# Patient Record
Sex: FEMALE | Race: WHITE | NOT HISPANIC OR LATINO | Employment: OTHER | URBAN - METROPOLITAN AREA
[De-identification: names, ages, dates, MRNs, and addresses within clinical notes are randomized per-mention and may not be internally consistent; named-entity substitution may affect disease eponyms.]

---

## 2017-03-13 RX ORDER — PIOGLITAZONEHYDROCHLORIDE 15 MG/1
TABLET ORAL
Qty: 90 TABLET | Refills: 2 | OUTPATIENT
Start: 2017-03-13

## 2018-06-25 ENCOUNTER — TELEPHONE (OUTPATIENT)
Dept: GASTROENTEROLOGY | Facility: CLINIC | Age: 63
End: 2018-06-25

## 2018-06-25 NOTE — TELEPHONE ENCOUNTER
UNABLE TO LOCATE IN CHART.  EXPLAINED ABOUT THE MOVE FROM [facility] TO [facility].  MAILED AUTHORIZATION TO OBTAIN HEALTH INFORMATIION.  VERIFY ADDRESS, WHICH IS CORRECT.  MAILED TODAY.

## 2019-03-13 ENCOUNTER — OFFICE VISIT (OUTPATIENT)
Dept: GASTROENTEROLOGY | Facility: CLINIC | Age: 64
End: 2019-03-13

## 2019-03-13 VITALS
HEIGHT: 59 IN | BODY MASS INDEX: 30.24 KG/M2 | WEIGHT: 150 LBS | DIASTOLIC BLOOD PRESSURE: 96 MMHG | HEART RATE: 91 BPM | SYSTOLIC BLOOD PRESSURE: 134 MMHG

## 2019-03-13 DIAGNOSIS — Z86.010 PERSONAL HISTORY OF COLONIC POLYPS: ICD-10-CM

## 2019-03-13 DIAGNOSIS — E03.9 HYPOTHYROIDISM, UNSPECIFIED TYPE: ICD-10-CM

## 2019-03-13 DIAGNOSIS — K75.81 NASH (NONALCOHOLIC STEATOHEPATITIS): Primary | ICD-10-CM

## 2019-03-13 DIAGNOSIS — E11.9 TYPE 2 DIABETES MELLITUS WITHOUT COMPLICATION, WITHOUT LONG-TERM CURRENT USE OF INSULIN (HCC): ICD-10-CM

## 2019-03-13 PROCEDURE — 99203 OFFICE O/P NEW LOW 30 MIN: CPT | Performed by: INTERNAL MEDICINE

## 2019-03-13 NOTE — PROGRESS NOTES
Here bubba Fatty liverr and recent Bronchitis had abx for and finished just a week ago.     PATIENT INFORMATION  Renee Jama       - 1955    CHIEF COMPLAINT  Chief Complaint   Patient presents with   • Elevated Hepatic Enzymes       HISTORY OF PRESENT ILLNESS  Also given prednisone and inhalers as well as tessalon Pearls and is on Advair routinely.    Reviewed her Us/CT formthe past and has had colon polyps in 2014 so is due this year.    Her Max weight in the last 6 month is 157 but that is down 10 pounds down prior to that and is on Phenteramine.     Enlarged fatty liver is seen on all her old scans and labs in the past are negative for autoimmune and PBC hepatitis                REVIEW OF SYSTEMS  Review of Systems   All other systems reviewed and are negative.        ACTIVE PROBLEMS  Patient Active Problem List    Diagnosis   • Hypothyroidism [E03.9]   • DAS (nonalcoholic steatohepatitis) [K75.81]   • Type 2 diabetes mellitus without complication, without long-term current use of insulin (CMS/HCC) [E11.9]         PAST MEDICAL HISTORY  Past Medical History:   Diagnosis Date   • Colon polyp    • Diabetes mellitus (CMS/HCC)    • Hyperlipidemia    • Hypertension    • Liver disease          SURGICAL HISTORY  Past Surgical History:   Procedure Laterality Date   • COLONOSCOPY           FAMILY HISTORY  Family History   Problem Relation Age of Onset   • Colon cancer Neg Hx    • Colon polyps Neg Hx          SOCIAL HISTORY  Social History     Occupational History   • Not on file   Tobacco Use   • Smoking status: Never Smoker   • Smokeless tobacco: Never Used   Substance and Sexual Activity   • Alcohol use: Yes   • Drug use: Not on file   • Sexual activity: Not on file       Debilities/Disabilities Identified: None    Emotional Behavior: Appropriate    CURRENT MEDICATIONS    Current Outpatient Medications:   •  albuterol sulfate  (90 Base) MCG/ACT inhaler, Inhale 2 puffs., Disp: , Rfl:   •  FARXIGA 10 MG  "tablet, Take 1 tablet by mouth Daily., Disp: , Rfl: 1  •  fluticasone (FLONASE) 50 MCG/ACT nasal spray, INSTILL 2 SPRAYS IN EACH NOSTRIL DAILY, Disp: , Rfl: 3  •  JANUVIA 50 MG tablet, Take 50 mg by mouth Daily., Disp: , Rfl: 1  •  levocetirizine (XYZAL) 5 MG tablet, Take 5 mg by mouth Every Evening., Disp: , Rfl: 0  •  levothyroxine (SYNTHROID, LEVOTHROID) 50 MCG tablet, Take 50 mcg by mouth Daily., Disp: , Rfl: 3  •  metFORMIN (GLUCOPHAGE) 500 MG tablet, Take 500 mg by mouth 2 (Two) Times a Day With Meals., Disp: , Rfl: 3  •  Omega-3 Fatty Acids (FISH OIL) 1000 MG capsule capsule, Take 2 capsules by mouth Daily., Disp: , Rfl:   •  phentermine (ADIPEX-P) 37.5 MG tablet, TAKE 0.5 TABLETS BY MOUTH EVERY MORNING BEFORE BREAKFAST BEFORE OR 1-2 HOURS AFTER BREAKFAST., Disp: , Rfl: 0  •  pioglitazone (ACTOS) 15 MG tablet, TAKE 1 TABLET BY MOUTH DAILY, Disp: 90 tablet, Rfl: 2  •  pravastatin (PRAVACHOL) 40 MG tablet, Take 20 mg by mouth Daily., Disp: , Rfl:   •  PROAIR  (90 Base) MCG/ACT inhaler, , Disp: , Rfl:   •  saline 0.65 % nasal solution (BABY AYR) 0.65 % solution, 1 spray into the nostril(s) as directed by provider., Disp: , Rfl:   •  TROKENDI XR 25 MG capsule sustained-release 24 hr, TAKE 1 CAPSULE BY MOUTH AS DIRECTED BEFORE OR 1-2 HOURS AFTER BREAKFAST, Disp: , Rfl: 11  •  valACYclovir (VALTREX) 1000 MG tablet, TAKE 2 TABLETS BY MOUTH 2 TIMES DAILY FOR 1 DAY. (ENOUGH FOR 3 OUTBREAKS), Disp: , Rfl: 0  •  valsartan-hydrochlorothiazide (DIOVAN-HCT) 320-12.5 MG per tablet, Take 1 tablet by mouth Daily., Disp: , Rfl:     ALLERGIES  Patient has no known allergies.    VITALS  Vitals:    03/13/19 0947   BP: 134/96   Pulse: 91   Weight: 68 kg (150 lb)   Height: 149.9 cm (59\")       [facility] Outpatient Visit on 09/16/2014   Component Date Value Ref Range Status   • Mitochondrial Ab 09/16/2014 <20.0  0.0 - 20.0 Units Final    Comment:                                 Negative    0.0 - 20.0             "                      Equivocal  20.1 - 24.9                                  Positive         >24.9  Mitochondrial (M2) Antibodies are found in 90-96% of  patients with primary biliary cirrhosis.     • BEN 09/16/2014 Positive* Negative Final   • BEN Titer 1 09/16/2014 1:320   Final    Comment: Anti-nuclear antibodies are seen in a variety of systemic rheumatic diseases.  In general, a titer of greater than or equal to 1:160 is considered a significant positive.  Titers of less than or equal to 1:80 are usually of no significance.  Low titers are common with advancing age.     • BEN Pattern 09/16/2014 Homogenous   Final   • Ferritin 09/16/2014 134.1  13.0 - 150.0 ng/mL Final   • GGT 09/16/2014 36  5 - 36 U/L Final   • Glucose 09/16/2014 136* 65 - 99 mg/dL Final   • BUN 09/16/2014 25* 6 - 20 mg/dL Final   • Creatinine 09/16/2014 0.74  0.57 - 1.00 mg/dL Final   • Sodium 09/16/2014 141  136 - 145 mmol/L Final   • Potassium 09/16/2014 3.8  3.5 - 5.2 mmol/L Final   • Chloride 09/16/2014 100  98 - 107 mmol/L Final   • CO2 09/16/2014 28  22 - 29 mmol/L Final   • Calcium 09/16/2014 10.6* 8.6 - 10.2 mg/dL Final   • Total Protein 09/16/2014 7.6  6.4 - 8.3 g/dL Final   • Albumin 09/16/2014 4.7  3.5 - 5.2 g/dL Final   • AST (SGOT) 09/16/2014 69* 5 - 32 U/L Final   • ALT (SGPT) 09/16/2014 130* 5 - 33 U/L Final   • Alkaline Phosphatase 09/16/2014 64  39 - 117 U/L Final   • Total Bilirubin 09/16/2014 0.6  0.1 - 1.2 mg/dL Final   • eGFR 09/16/2014 >60  ml/min/1.732 Final    Comment: DF by IF @ 09/16/2014 13:54  GFR Normal                            >60  Chronic Kidney Disease          <60  Kidney Failure                         <15     • Hep C Virus Ab 09/16/2014 NonReactive  NONREACTIVE Final     No results found.    PHYSICAL EXAM  Physical Exam   Constitutional: She is oriented to person, place, and time. She appears well-developed and well-nourished.   Eyes: Conjunctivae and EOM are normal. Pupils are equal, round, and reactive  to light. No scleral icterus.   Neck: Normal range of motion. Neck supple. No thyromegaly present.   Cardiovascular: Normal rate, regular rhythm, normal heart sounds and intact distal pulses. Exam reveals no gallop.   No murmur heard.  Pulmonary/Chest: Effort normal and breath sounds normal. She has no wheezes. She has no rales.   Abdominal: Soft. Bowel sounds are normal. She exhibits no shifting dullness, no distension, no fluid wave, no abdominal bruit, no ascites and no mass. There is hepatosplenomegaly and hepatomegaly. There is no tenderness. There is no guarding and negative Ayala's sign. Hernia confirmed negative in the ventral area.   Musculoskeletal: Normal range of motion. She exhibits no edema.   Lymphadenopathy:     She has no cervical adenopathy.   Neurological: She is alert and oriented to person, place, and time.   Skin: Skin is warm and dry. No rash noted. She is not diaphoretic. No erythema.       ASSESSMENT  Diagnoses and all orders for this visit:    DAS (nonalcoholic steatohepatitis)    Type 2 diabetes mellitus without complication, without long-term current use of insulin (CMS/HCC)    Hypothyroidism, unspecified type    Personal history of colonic polyps          PLAN  Will have her work on weight and get 6 weeks away form her abx and if no improvement may need a Fibrosure to motivate her and assess any chronic scarring    Check labs in 6 weeks and she wants drawn at PCP    Return in about 2 months (around 5/13/2019).

## 2019-06-28 ENCOUNTER — TELEPHONE (OUTPATIENT)
Dept: GASTROENTEROLOGY | Facility: CLINIC | Age: 64
End: 2019-06-28

## 2019-07-05 DIAGNOSIS — Z12.11 SCREENING FOR COLON CANCER: Primary | ICD-10-CM

## 2019-07-11 NOTE — TELEPHONE ENCOUNTER
CALLED AND SPOKE WITH PATIENT.  SCHEDULED [facility] 09/30/2019 AT 8:30AM - [facility] 7:30AM.  WILL MAIL INSTRUCTIONS.

## 2019-08-20 ENCOUNTER — LAB (OUTPATIENT)
Dept: LAB | Facility: HOSPITAL | Age: 64
End: 2019-08-20

## 2019-08-20 ENCOUNTER — OFFICE VISIT (OUTPATIENT)
Dept: GASTROENTEROLOGY | Facility: CLINIC | Age: 64
End: 2019-08-20

## 2019-08-20 VITALS
SYSTOLIC BLOOD PRESSURE: 132 MMHG | HEIGHT: 59 IN | BODY MASS INDEX: 30.22 KG/M2 | DIASTOLIC BLOOD PRESSURE: 88 MMHG | WEIGHT: 149.91 LBS

## 2019-08-20 DIAGNOSIS — K75.81 NASH (NONALCOHOLIC STEATOHEPATITIS): ICD-10-CM

## 2019-08-20 DIAGNOSIS — Z86.010 PERSONAL HISTORY OF COLONIC POLYPS: ICD-10-CM

## 2019-08-20 DIAGNOSIS — K75.81 NASH (NONALCOHOLIC STEATOHEPATITIS): Primary | ICD-10-CM

## 2019-08-20 DIAGNOSIS — E03.9 HYPOTHYROIDISM, UNSPECIFIED TYPE: ICD-10-CM

## 2019-08-20 DIAGNOSIS — E11.9 TYPE 2 DIABETES MELLITUS WITHOUT COMPLICATION, WITHOUT LONG-TERM CURRENT USE OF INSULIN (HCC): ICD-10-CM

## 2019-08-20 PROCEDURE — 36415 COLL VENOUS BLD VENIPUNCTURE: CPT

## 2019-08-20 PROCEDURE — 84460 ALANINE AMINO (ALT) (SGPT): CPT

## 2019-08-20 PROCEDURE — 82247 BILIRUBIN TOTAL: CPT

## 2019-08-20 PROCEDURE — 84478 ASSAY OF TRIGLYCERIDES: CPT

## 2019-08-20 PROCEDURE — 84450 TRANSFERASE (AST) (SGOT): CPT

## 2019-08-20 PROCEDURE — 82465 ASSAY BLD/SERUM CHOLESTEROL: CPT

## 2019-08-20 PROCEDURE — 99212 OFFICE O/P EST SF 10 MIN: CPT | Performed by: INTERNAL MEDICINE

## 2019-08-20 PROCEDURE — 82172 ASSAY OF APOLIPOPROTEIN: CPT

## 2019-08-20 PROCEDURE — 82977 ASSAY OF GGT: CPT

## 2019-08-20 PROCEDURE — 83010 ASSAY OF HAPTOGLOBIN QUANT: CPT

## 2019-08-20 PROCEDURE — 82947 ASSAY GLUCOSE BLOOD QUANT: CPT

## 2019-08-20 PROCEDURE — 83883 ASSAY NEPHELOMETRY NOT SPEC: CPT

## 2019-08-20 NOTE — PROGRESS NOTES
PATIENT INFORMATION  Renee Jama       - 1955    CHIEF COMPLAINT  Chief Complaint   Patient presents with   • Follow-up     2 mo follow up on DAS       HISTORY OF PRESENT ILLNESS  Injured foot in [place] and has been off her exercise routine but is better now. And walks now and but the heat has gotten the way.     Is On Phenteramine but willneed to exercise to lose consistantly.            REVIEW OF SYSTEMS  Review of Systems   All other systems reviewed and are negative.        ACTIVE PROBLEMS  Patient Active Problem List    Diagnosis   • Hypothyroidism [E03.9]   • DAS (nonalcoholic steatohepatitis) [K75.81]   • Type 2 diabetes mellitus without complication, without long-term current use of insulin (CMS/HCC) [E11.9]         PAST MEDICAL HISTORY  Past Medical History:   Diagnosis Date   • Colon polyp    • Diabetes mellitus (CMS/HCC)    • Hyperlipidemia    • Hypertension    • Liver disease          SURGICAL HISTORY  Past Surgical History:   Procedure Laterality Date   • COLONOSCOPY           FAMILY HISTORY  Family History   Problem Relation Age of Onset   • Colon cancer Neg Hx    • Colon polyps Neg Hx          SOCIAL HISTORY  Social History     Occupational History   • Not on file   Tobacco Use   • Smoking status: Never Smoker   • Smokeless tobacco: Never Used   Substance and Sexual Activity   • Alcohol use: Yes   • Drug use: Not on file   • Sexual activity: Not on file       Debilities/Disabilities Identified: None    Emotional Behavior: Appropriate    CURRENT MEDICATIONS    Current Outpatient Medications:   •  albuterol sulfate  (90 Base) MCG/ACT inhaler, Inhale 2 puffs., Disp: , Rfl:   •  FARXIGA 10 MG tablet, Take 1 tablet by mouth Daily., Disp: , Rfl: 1  •  fluticasone (FLONASE) 50 MCG/ACT nasal spray, INSTILL 2 SPRAYS IN EACH NOSTRIL DAILY, Disp: , Rfl: 3  •  JANUVIA 50 MG tablet, Take 50 mg by mouth Daily., Disp: , Rfl: 1  •  levocetirizine (XYZAL) 5 MG tablet, Take 5 mg by mouth Every  "Evening., Disp: , Rfl: 0  •  levothyroxine (SYNTHROID, LEVOTHROID) 50 MCG tablet, Take 50 mcg by mouth Daily., Disp: , Rfl: 3  •  metFORMIN (GLUCOPHAGE) 500 MG tablet, Take 500 mg by mouth 2 (Two) Times a Day With Meals., Disp: , Rfl: 3  •  Omega-3 Fatty Acids (FISH OIL) 1000 MG capsule capsule, Take 2 capsules by mouth Daily., Disp: , Rfl:   •  phentermine (ADIPEX-P) 37.5 MG tablet, TAKE 0.5 TABLETS BY MOUTH EVERY MORNING BEFORE BREAKFAST BEFORE OR 1-2 HOURS AFTER BREAKFAST., Disp: , Rfl: 0  •  pioglitazone (ACTOS) 15 MG tablet, TAKE 1 TABLET BY MOUTH DAILY, Disp: 90 tablet, Rfl: 2  •  pravastatin (PRAVACHOL) 40 MG tablet, Take 20 mg by mouth Daily., Disp: , Rfl:   •  PROAIR  (90 Base) MCG/ACT inhaler, , Disp: , Rfl:   •  saline 0.65 % nasal solution (BABY AYR) 0.65 % solution, 1 spray into the nostril(s) as directed by provider., Disp: , Rfl:   •  TROKENDI XR 25 MG capsule sustained-release 24 hr, TAKE 1 CAPSULE BY MOUTH AS DIRECTED BEFORE OR 1-2 HOURS AFTER BREAKFAST, Disp: , Rfl: 11  •  valACYclovir (VALTREX) 1000 MG tablet, TAKE 2 TABLETS BY MOUTH 2 TIMES DAILY FOR 1 DAY. (ENOUGH FOR 3 OUTBREAKS), Disp: , Rfl: 0  •  valsartan-hydrochlorothiazide (DIOVAN-HCT) 320-12.5 MG per tablet, Take 1 tablet by mouth Daily., Disp: , Rfl:     ALLERGIES  Patient has no known allergies.    VITALS  Vitals:    08/20/19 1003   BP: 132/88   Weight: 68 kg (149 lb 14.6 oz)   Height: 149.9 cm (59.02\")       LAST RESULTS   Hospital Outpatient Visit on 09/16/2014   Component Date Value Ref Range Status   • Mitochondrial Ab 09/16/2014 <20.0  0.0 - 20.0 Units Final    Comment:                                 Negative    0.0 - 20.0                                  Equivocal  20.1 - 24.9                                  Positive         >24.9  Mitochondrial (M2) Antibodies are found in 90-96% of  patients with primary biliary cirrhosis.     • BEN 09/16/2014 Positive* Negative Final   • BEN Titer 1 09/16/2014 1:320   Final    " Comment: Anti-nuclear antibodies are seen in a variety of systemic rheumatic diseases.  In general, a titer of greater than or equal to 1:160 is considered a significant positive.  Titers of less than or equal to 1:80 are usually of no significance.  Low titers are common with advancing age.     • BEN Pattern 09/16/2014 Homogenous   Final   • Ferritin 09/16/2014 134.1  13.0 - 150.0 ng/mL Final   • GGT 09/16/2014 36  5 - 36 U/L Final   • Glucose 09/16/2014 136* 65 - 99 mg/dL Final   • BUN 09/16/2014 25* 6 - 20 mg/dL Final   • Creatinine 09/16/2014 0.74  0.57 - 1.00 mg/dL Final   • Sodium 09/16/2014 141  136 - 145 mmol/L Final   • Potassium 09/16/2014 3.8  3.5 - 5.2 mmol/L Final   • Chloride 09/16/2014 100  98 - 107 mmol/L Final   • CO2 09/16/2014 28  22 - 29 mmol/L Final   • Calcium 09/16/2014 10.6* 8.6 - 10.2 mg/dL Final   • Total Protein 09/16/2014 7.6  6.4 - 8.3 g/dL Final   • Albumin 09/16/2014 4.7  3.5 - 5.2 g/dL Final   • AST (SGOT) 09/16/2014 69* 5 - 32 U/L Final   • ALT (SGPT) 09/16/2014 130* 5 - 33 U/L Final   • Alkaline Phosphatase 09/16/2014 64  39 - 117 U/L Final   • Total Bilirubin 09/16/2014 0.6  0.1 - 1.2 mg/dL Final   • eGFR 09/16/2014 >60  ml/min/1.732 Final    Comment: DF by IF @ 09/16/2014 13:54  GFR Normal                            >60  Chronic Kidney Disease          <60  Kidney Failure                         <15     • Hep C Virus Ab 09/16/2014 NonReactive  NONREACTIVE Final     No results found.    PHYSICAL EXAM  Physical Exam   Constitutional: She is oriented to person, place, and time. She appears well-developed and well-nourished.   HENT:   Head: Normocephalic and atraumatic.   Eyes: Conjunctivae and EOM are normal. Pupils are equal, round, and reactive to light. No scleral icterus.   Neck: Normal range of motion. Neck supple. No thyromegaly present.   Cardiovascular: Normal rate, regular rhythm, normal heart sounds and intact distal pulses. Exam reveals no gallop.   No murmur  heard.  Pulmonary/Chest: Effort normal and breath sounds normal. She has no wheezes. She has no rales.   Abdominal: Soft. Bowel sounds are normal. She exhibits no shifting dullness, no distension, no fluid wave, no abdominal bruit, no ascites and no mass. There is no hepatosplenomegaly. There is no tenderness. There is no guarding and negative Ayala's sign. Hernia confirmed negative in the ventral area.   Musculoskeletal: Normal range of motion. She exhibits no edema.   Lymphadenopathy:     She has no cervical adenopathy.   Neurological: She is alert and oriented to person, place, and time.   Skin: Skin is warm and dry. No rash noted. She is not diaphoretic. No erythema.   Psychiatric: She has a normal mood and affect. Her behavior is normal.       ASSESSMENT  Diagnoses and all orders for this visit:    DAS (nonalcoholic steatohepatitis)  -     DAS Fibrosure; Future    Type 2 diabetes mellitus without complication, without long-term current use of insulin (CMS/HCC)    Hypothyroidism, unspecified type    Personal history of colonic polyps          PLAN  Return in about 3 months (around 11/20/2019).

## 2019-08-23 LAB
A2 MACROGLOB SERPL-MCNC: 221 MG/DL (ref 110–276)
ALT SERPL W P-5'-P-CCNC: 83 IU/L (ref 0–40)
APO A-I SERPL-MCNC: 169 MG/DL (ref 116–209)
AST SERPL W P-5'-P-CCNC: 47 IU/L (ref 0–40)
BILIRUB SERPL-MCNC: 0.4 MG/DL (ref 0–1.2)
CHOLEST SERPL-MCNC: 199 MG/DL (ref 100–199)
FIBROSIS SCORING:: ABNORMAL
FIBROSIS STAGE SERPL QL: ABNORMAL
GGT SERPL-CCNC: 23 IU/L (ref 0–60)
GLUCOSE SERPL-MCNC: 138 MG/DL (ref 65–99)
HAPTOGLOB SERPL-MCNC: 96 MG/DL (ref 34–200)
INTERPRETATIONS: (REFERENCE): ABNORMAL
LABORATORY COMMENT REPORT: ABNORMAL
LIMITATIONS: (REFERENCE): ABNORMAL
LIVER FIBR SCORE SERPL CALC.FIBROSURE: 0.21 (ref 0–0.21)
NASH GRADE (REFERENCE): ABNORMAL
NASH SCORE (REFERENCE): 0.25
NASH SCORING (REFERENCE): ABNORMAL
STEATOSIS GRADE (REFERENCE): ABNORMAL
STEATOSIS GRADING (REFERENCE): ABNORMAL
STEATOSIS SCORE (REFERENCE): 0.17 (ref 0–0.3)
TRIGL SERPL-MCNC: 174 MG/DL (ref 0–149)
WEIGHT: (REFERENCE): 49 LBS

## 2019-09-30 ENCOUNTER — TELEPHONE (OUTPATIENT)
Dept: GASTROENTEROLOGY | Facility: CLINIC | Age: 64
End: 2019-09-30

## 2019-09-30 NOTE — TELEPHONE ENCOUNTER
"RECEIVED E-MAIL. \"WE HAD TO CANCEL PATIENT FOR TODAY.  SHE TAKING PHENTERMINE AND HAS TO BE OFF OF THIS FOR 2 WEEKS\".    CANCEL PROCEDURE.  "

## 2019-10-02 ENCOUNTER — TELEPHONE (OUTPATIENT)
Dept: GASTROENTEROLOGY | Facility: CLINIC | Age: 64
End: 2019-10-02

## 2019-10-02 NOTE — TELEPHONE ENCOUNTER
SPOKE WITH PATIENT.  WAS [place]EDUL[place] [facility].  HAD TO CANCEL DUE TO MEDICATION SHE WAS TO STOP.  RESCHEDULED [facility] 11/18/2019 AT 11AM - [facility] 10AM.  WILL MAIL NEW INSTRUCTIONS.

## 2019-11-18 ENCOUNTER — OUTSIDE FACILITY SERVICE (OUTPATIENT)
Dept: GASTROENTEROLOGY | Facility: CLINIC | Age: 64
End: 2019-11-18

## 2019-11-18 PROCEDURE — 45378 DIAGNOSTIC COLONOSCOPY: CPT | Performed by: INTERNAL MEDICINE

## 2021-02-25 ENCOUNTER — OFFICE VISIT (OUTPATIENT)
Dept: GASTROENTEROLOGY | Facility: CLINIC | Age: 66
End: 2021-02-25

## 2021-02-25 DIAGNOSIS — K75.81 NASH (NONALCOHOLIC STEATOHEPATITIS): Primary | ICD-10-CM

## 2021-02-25 DIAGNOSIS — E11.9 TYPE 2 DIABETES MELLITUS WITHOUT COMPLICATION, WITHOUT LONG-TERM CURRENT USE OF INSULIN (HCC): ICD-10-CM

## 2021-02-25 DIAGNOSIS — Z86.010 PERSONAL HISTORY OF COLONIC POLYPS: ICD-10-CM

## 2021-02-25 PROCEDURE — 99443 PR PHYS/QHP TELEPHONE EVALUATION 21-30 MIN: CPT | Performed by: INTERNAL MEDICINE

## 2021-02-25 RX ORDER — EMPAGLIFLOZIN 10 MG/1
TABLET, FILM COATED ORAL
COMMUNITY
Start: 2021-02-10 | End: 2021-11-16

## 2021-02-25 RX ORDER — CELECOXIB 200 MG/1
200 CAPSULE ORAL
COMMUNITY
Start: 2020-10-21 | End: 2021-11-16

## 2021-02-25 NOTE — PROGRESS NOTES
Tele visit:  Unable to complete visit using a video connection to the patient. A phone visit was used to complete this visits. You have chosen to receive care through a telephone visit. Do you consent to use a telephone visit for your medical care today? Yes    PATIENT INFORMATION  Renee Jama       - 1955    CHIEF COMPLAINT  Chief Complaint   Patient presents with   • Abnormal Lab     DAS       HISTORY OF PRESENT ILLNESS  Staying home  For COVID , shoulder surgery in October. Weight is up c/w her elevated labs and is just now back to exercise.     Reviewed several meds including her OTC and her fish oil and her Vit D and Vit E and her Diet for BS and her Trazadone for sleep    Working to control her BS too and her last colon was  and was normal with history of polyps so recall in       REVIEWED PERTINENT RESULTS/ LABS  No results found for: CASEREPORT, FINALDX  Lab Results   Component Value Date     (H) 2020    AST 78 (H) 2020    HGBA1C 7.0 (H) 2020    TRIG 171 (H) 2020    FERRITIN 134.1 2014    IRON 112 2019    TIBC 372 2019      No results found.    REVIEW OF SYSTEMS  Review of Systems   All other systems reviewed and are negative.        ACTIVE PROBLEMS  Patient Active Problem List    Diagnosis   • Hypothyroidism [E03.9]   • DAS (nonalcoholic steatohepatitis) [K75.81]   • Type 2 diabetes mellitus without complication, without long-term current use of insulin (CMS/HCC) [E11.9]         PAST MEDICAL HISTORY  Past Medical History:   Diagnosis Date   • Colon polyp    • Diabetes mellitus (CMS/HCC)    • Hyperlipidemia    • Hypertension    • Liver disease          SURGICAL HISTORY  Past Surgical History:   Procedure Laterality Date   • COLONOSCOPY           FAMILY HISTORY  Family History   Problem Relation Age of Onset   • Colon cancer Neg Hx    • Colon polyps Neg Hx          SOCIAL HISTORY  Social History     Occupational History   • Not on file    Tobacco Use   • Smoking status: Never Smoker   • Smokeless tobacco: Never Used   Substance and Sexual Activity   • Alcohol use: Yes   • Drug use: Not on file   • Sexual activity: Not on file         CURRENT MEDICATIONS    Current Outpatient Medications:   •  celecoxib (CeleBREX) 200 MG capsule, Take 200 mg by mouth., Disp: , Rfl:   •  Aspirin Buf,CaCarb-MgCarb-MgO, 81 MG tablet, Take 81 mg by mouth Daily., Disp: , Rfl:   •  FARXIGA 10 MG tablet, Take 1 tablet by mouth Daily., Disp: , Rfl: 1  •  fluticasone (FLONASE) 50 MCG/ACT nasal spray, INSTILL 2 SPRAYS IN EACH NOSTRIL DAILY, Disp: , Rfl: 3  •  JANUVIA 50 MG tablet, Take 50 mg by mouth Daily., Disp: , Rfl: 1  •  Jardiance 10 MG tablet, TAKE 1 TABLET BY MOUTH EVERY DAY (STOP FARXIGA), Disp: , Rfl:   •  levothyroxine (SYNTHROID, LEVOTHROID) 50 MCG tablet, Take 50 mcg by mouth Daily., Disp: , Rfl: 3  •  metFORMIN (GLUCOPHAGE) 500 MG tablet, Take 500 mg by mouth 2 (Two) Times a Day With Meals., Disp: , Rfl: 3  •  Omega-3 Fatty Acids (FISH OIL) 1000 MG capsule capsule, Take 2 capsules by mouth Daily., Disp: , Rfl:   •  pravastatin (PRAVACHOL) 40 MG tablet, Take 20 mg by mouth Daily., Disp: , Rfl:   •  PROAIR  (90 Base) MCG/ACT inhaler, , Disp: , Rfl:   •  saline 0.65 % nasal solution (BABY AYR) 0.65 % solution, 1 spray into the nostril(s) as directed by provider., Disp: , Rfl:   •  valACYclovir (VALTREX) 1000 MG tablet, TAKE 2 TABLETS BY MOUTH 2 TIMES DAILY FOR 1 DAY. (ENOUGH FOR 3 OUTBREAKS), Disp: , Rfl: 0  •  valsartan-hydrochlorothiazide (DIOVAN-HCT) 320-12.5 MG per tablet, Take 1 tablet by mouth Daily., Disp: , Rfl:     ALLERGIES  Patient has no known allergies.    VITALS  There were no vitals filed for this visit.    PHYSICAL EXAM  Debilities/Disabilities Identified: None  Emotional Behavior: Appropriate  Wt Readings from Last 3 Encounters:   08/20/19 68 kg (149 lb 14.6 oz)   03/13/19 68 kg (150 lb)     Ht Readings from Last 1 Encounters:   08/20/19  "149.9 cm (59.02\")     There is no height or weight on file to calculate BMI.  Physical Exam    CLINICAL DATA REVIEWED   reviewed previous lab results and integrated with today's visit, reviewed notes from other physicians and/or last GI encounter, reviewed previous endoscopy results and available photos, reviewed surgical pathology results from previous biopsies    ASSESSMENT  Diagnoses and all orders for this visit:    DAS (nonalcoholic steatohepatitis)  -     DAS Fibrosure  -     Comprehensive Metabolic Panel    Type 2 diabetes mellitus without complication, without long-term current use of insulin (CMS/HCC)    Personal history of colonic polyps    Other orders  -     Aspirin Buf,CaCarb-MgCarb-MgO, 81 MG tablet; Take 81 mg by mouth Daily.  -     celecoxib (CeleBREX) 200 MG capsule; Take 200 mg by mouth.  -     Jardiance 10 MG tablet; TAKE 1 TABLET BY MOUTH EVERY DAY (STOP FARXIGA)          PLAN  Will repeat labs now that she is back on the weight loss wagon and recheck her fibrosure that was no particularly helpful last time.     Return in about 3 months (around 5/25/2021).    I have discussed the above plan with the patient.  They verbalize understanding and are in agreement with the plan.  They have been advised to contact the office for any questions, concerns, or changes related to their health.    Total time reviewing and interviewing as well as charting was 25 minutes                  "

## 2021-03-04 ENCOUNTER — LAB (OUTPATIENT)
Dept: LAB | Facility: HOSPITAL | Age: 66
End: 2021-03-04

## 2021-03-04 LAB
ALBUMIN SERPL-MCNC: 4.4 G/DL (ref 3.5–5.2)
ALBUMIN/GLOB SERPL: 1.6 G/DL
ALP SERPL-CCNC: 70 U/L (ref 39–117)
ALT SERPL W P-5'-P-CCNC: 153 U/L (ref 1–33)
ANION GAP SERPL CALCULATED.3IONS-SCNC: 9.2 MMOL/L (ref 5–15)
AST SERPL-CCNC: 72 U/L (ref 1–32)
BILIRUB SERPL-MCNC: 0.4 MG/DL (ref 0–1.2)
BUN SERPL-MCNC: 22 MG/DL (ref 8–23)
BUN/CREAT SERPL: 29.3 (ref 7–25)
CALCIUM SPEC-SCNC: 9.9 MG/DL (ref 8.6–10.5)
CHLORIDE SERPL-SCNC: 101 MMOL/L (ref 98–107)
CO2 SERPL-SCNC: 28.8 MMOL/L (ref 22–29)
CREAT SERPL-MCNC: 0.75 MG/DL (ref 0.57–1)
GFR SERPL CREATININE-BSD FRML MDRD: 78 ML/MIN/1.73
GLOBULIN UR ELPH-MCNC: 2.8 GM/DL
GLUCOSE SERPL-MCNC: 180 MG/DL (ref 65–99)
POTASSIUM SERPL-SCNC: 4 MMOL/L (ref 3.5–5.2)
PROT SERPL-MCNC: 7.2 G/DL (ref 6–8.5)
SODIUM SERPL-SCNC: 139 MMOL/L (ref 136–145)

## 2021-03-04 PROCEDURE — 82977 ASSAY OF GGT: CPT | Performed by: INTERNAL MEDICINE

## 2021-03-04 PROCEDURE — 84478 ASSAY OF TRIGLYCERIDES: CPT | Performed by: INTERNAL MEDICINE

## 2021-03-04 PROCEDURE — 82172 ASSAY OF APOLIPOPROTEIN: CPT | Performed by: INTERNAL MEDICINE

## 2021-03-04 PROCEDURE — 83010 ASSAY OF HAPTOGLOBIN QUANT: CPT | Performed by: INTERNAL MEDICINE

## 2021-03-04 PROCEDURE — 82465 ASSAY BLD/SERUM CHOLESTEROL: CPT | Performed by: INTERNAL MEDICINE

## 2021-03-04 PROCEDURE — 83883 ASSAY NEPHELOMETRY NOT SPEC: CPT | Performed by: INTERNAL MEDICINE

## 2021-03-04 PROCEDURE — 80053 COMPREHEN METABOLIC PANEL: CPT | Performed by: INTERNAL MEDICINE

## 2021-03-04 PROCEDURE — 36415 COLL VENOUS BLD VENIPUNCTURE: CPT | Performed by: INTERNAL MEDICINE

## 2021-03-09 DIAGNOSIS — R79.89 LFTS ABNORMAL: Primary | ICD-10-CM

## 2021-03-09 LAB
A2 MACROGLOB SERPL-MCNC: 212 MG/DL (ref 110–276)
ALT SERPL W P-5'-P-CCNC: 177 IU/L (ref 0–40)
APO A-I SERPL-MCNC: 171 MG/DL (ref 116–209)
AST SERPL W P-5'-P-CCNC: 86 IU/L (ref 0–40)
BILIRUB SERPL-MCNC: 0.3 MG/DL (ref 0–1.2)
CHOLEST SERPL-MCNC: 185 MG/DL (ref 100–199)
FIBROSIS SCORING:: ABNORMAL
FIBROSIS STAGE SERPL QL: ABNORMAL
GGT SERPL-CCNC: 50 IU/L (ref 0–60)
GLUCOSE SERPL-MCNC: 178 MG/DL (ref 65–99)
HAPTOGLOB SERPL-MCNC: 88 MG/DL (ref 37–355)
INTERPRETATIONS: (REFERENCE): ABNORMAL
LABORATORY COMMENT REPORT: ABNORMAL
LIVER FIBR SCORE SERPL CALC.FIBROSURE: 0.23 (ref 0–0.21)
NASH SCORING (REFERENCE): ABNORMAL
NECROINFLAMMATORY ACT GRADE SERPL QL: ABNORMAL
NECROINFLAMMATORY ACT SCORE SERPL: 0.25
SERVICE CMNT-IMP: ABNORMAL
STEATOSIS GRADE (REFERENCE): ABNORMAL
STEATOSIS GRADING (REFERENCE): ABNORMAL
STEATOSIS SCORE (REFERENCE): 0.34 (ref 0–0.3)
TRIGL SERPL-MCNC: 190 MG/DL (ref 0–149)

## 2021-03-19 ENCOUNTER — BULK ORDERING (OUTPATIENT)
Dept: CASE MANAGEMENT | Facility: OTHER | Age: 66
End: 2021-03-19

## 2021-03-19 DIAGNOSIS — Z23 IMMUNIZATION DUE: ICD-10-CM

## 2021-06-15 ENCOUNTER — OFFICE VISIT (OUTPATIENT)
Dept: GASTROENTEROLOGY | Facility: CLINIC | Age: 66
End: 2021-06-15

## 2021-06-15 VITALS — WEIGHT: 150 LBS | BODY MASS INDEX: 30.24 KG/M2 | HEIGHT: 59 IN

## 2021-06-15 DIAGNOSIS — K75.81 NASH (NONALCOHOLIC STEATOHEPATITIS): Primary | ICD-10-CM

## 2021-06-15 DIAGNOSIS — K21.00 GASTROESOPHAGEAL REFLUX DISEASE WITH ESOPHAGITIS WITHOUT HEMORRHAGE: ICD-10-CM

## 2021-06-15 PROCEDURE — 99213 OFFICE O/P EST LOW 20 MIN: CPT | Performed by: INTERNAL MEDICINE

## 2021-06-15 RX ORDER — EMPAGLIFLOZIN 10 MG/1
TABLET, FILM COATED ORAL
COMMUNITY
Start: 2021-05-10 | End: 2021-11-16 | Stop reason: DRUGHIGH

## 2021-06-15 RX ORDER — MONTELUKAST SODIUM 10 MG/1
TABLET ORAL
COMMUNITY
Start: 2021-05-14 | End: 2021-11-16

## 2021-06-15 NOTE — PROGRESS NOTES
PATIENT INFORMATION  Renee Jama       - 1955    CHIEF COMPLAINT  Chief Complaint   Patient presents with   • Steatohepatitis       HISTORY OF PRESENT ILLNESS  Here for follow up of DAS and her weight is up but planning on readdressing this and that was enccouraged and will get her labs checkeed in two days and her Fibrosure was encouraging with F0-1    Also rare HB so reviewed lifestyle and meds to reach for PRN      REVIEWED PERTINENT RESULTS/ LABS  No results found for: CASEREPORT, FINALDX  Lab Results   Component Value Date     (H) 2021    AST 72 (H) 2021    HGBA1C 7.0 (H) 2020    TRIG 190 (H) 2021    FERRITIN 134.1 2014    IRON 112 2019    TIBC 372 2019      No results found.    REVIEW OF SYSTEMS  Review of Systems   Musculoskeletal: Positive for back pain.         ACTIVE PROBLEMS  Patient Active Problem List    Diagnosis    • Hypothyroidism [E03.9]    • DAS (nonalcoholic steatohepatitis) [K75.81]    • Type 2 diabetes mellitus without complication, without long-term current use of insulin (CMS/HCC) [E11.9]          PAST MEDICAL HISTORY  Past Medical History:   Diagnosis Date   • Colon polyp    • Diabetes mellitus (CMS/HCC)    • Hyperlipidemia    • Hypertension    • Liver disease          SURGICAL HISTORY  Past Surgical History:   Procedure Laterality Date   • COLONOSCOPY           FAMILY HISTORY  Family History   Problem Relation Age of Onset   • Colon cancer Neg Hx    • Colon polyps Neg Hx          SOCIAL HISTORY  Social History     Occupational History   • Not on file   Tobacco Use   • Smoking status: Never Smoker   • Smokeless tobacco: Never Used   Substance and Sexual Activity   • Alcohol use: Yes   • Drug use: Not on file   • Sexual activity: Not on file         CURRENT MEDICATIONS    Current Outpatient Medications:   •  Aspirin Buf,CaCarb-MgCarb-MgO, 81 MG tablet, Take 81 mg by mouth Daily., Disp: , Rfl:   •  celecoxib (CeleBREX) 200 MG  "capsule, Take 200 mg by mouth., Disp: , Rfl:   •  Empagliflozin (Jardiance) 10 MG tablet, TAKE 1 TABLET BY MOUTH EVERY DAY (STOP FARXIGA), Disp: , Rfl:   •  FARXIGA 10 MG tablet, Take 1 tablet by mouth Daily., Disp: , Rfl: 1  •  fluticasone (FLONASE) 50 MCG/ACT nasal spray, INSTILL 2 SPRAYS IN EACH NOSTRIL DAILY, Disp: , Rfl: 3  •  fluticasone-salmeterol (ADVAIR) 250-50 MCG/DOSE DISKUS, Inhale., Disp: , Rfl:   •  JANUVIA 50 MG tablet, Take 50 mg by mouth Daily., Disp: , Rfl: 1  •  Jardiance 10 MG tablet, TAKE 1 TABLET BY MOUTH EVERY DAY (STOP FARXIGA), Disp: , Rfl:   •  levothyroxine (SYNTHROID, LEVOTHROID) 50 MCG tablet, Take 50 mcg by mouth Daily., Disp: , Rfl: 3  •  metFORMIN (GLUCOPHAGE) 500 MG tablet, Take 500 mg by mouth 2 (Two) Times a Day With Meals., Disp: , Rfl: 3  •  montelukast (SINGULAIR) 10 MG tablet, , Disp: , Rfl:   •  Omega-3 Fatty Acids (FISH OIL) 1000 MG capsule capsule, Take 2 capsules by mouth Daily., Disp: , Rfl:   •  pravastatin (PRAVACHOL) 40 MG tablet, Take 20 mg by mouth Daily., Disp: , Rfl:   •  PROAIR  (90 Base) MCG/ACT inhaler, , Disp: , Rfl:   •  saline 0.65 % nasal solution (BABY AYR) 0.65 % solution, 1 spray into the nostril(s) as directed by provider., Disp: , Rfl:   •  valACYclovir (VALTREX) 1000 MG tablet, TAKE 2 TABLETS BY MOUTH 2 TIMES DAILY FOR 1 DAY. (ENOUGH FOR 3 OUTBREAKS), Disp: , Rfl: 0  •  valsartan-hydrochlorothiazide (DIOVAN-HCT) 320-12.5 MG per tablet, Take 1 tablet by mouth Daily., Disp: , Rfl:     ALLERGIES  Patient has no known allergies.    VITALS  Vitals:    06/15/21 1054   Weight: 68 kg (150 lb)   Height: 149.9 cm (59\")       PHYSICAL EXAM  Debilities/Disabilities Identified: None  Emotional Behavior: Appropriate  Wt Readings from Last 3 Encounters:   06/15/21 68 kg (150 lb)   08/20/19 68 kg (149 lb 14.6 oz)   03/13/19 68 kg (150 lb)     Ht Readings from Last 1 Encounters:   06/15/21 149.9 cm (59\")     Body mass index is 30.3 kg/m².  Physical " Exam  Constitutional:       Appearance: She is well-developed. She is not diaphoretic.   HENT:      Head: Normocephalic and atraumatic.   Eyes:      General: No scleral icterus.     Conjunctiva/sclera: Conjunctivae normal.      Pupils: Pupils are equal, round, and reactive to light.   Neck:      Thyroid: No thyromegaly.   Cardiovascular:      Rate and Rhythm: Normal rate and regular rhythm.      Heart sounds: Normal heart sounds. No murmur heard.   No gallop.    Pulmonary:      Effort: Pulmonary effort is normal.      Breath sounds: Normal breath sounds. No wheezing or rales.   Abdominal:      General: Bowel sounds are normal. There is no distension or abdominal bruit.      Palpations: Abdomen is soft. There is no shifting dullness, fluid wave or mass.      Tenderness: There is abdominal tenderness. There is no guarding. Negative signs include Ayala's sign.      Hernia: There is no hernia in the ventral area.   Musculoskeletal:         General: Normal range of motion.      Cervical back: Normal range of motion and neck supple.   Lymphadenopathy:      Cervical: No cervical adenopathy.   Skin:     General: Skin is warm and dry.      Findings: No erythema or rash.   Neurological:      Mental Status: She is alert and oriented to person, place, and time.   Psychiatric:         Mood and Affect: Mood normal.         Behavior: Behavior normal.         CLINICAL DATA REVIEWED   reviewed previous lab results and integrated with today's visit, reviewed notes from other physicians and/or last GI encounter, reviewed previous endoscopy results and available photos, reviewed surgical pathology results from previous biopsies    ASSESSMENT  Diagnoses and all orders for this visit:    DAS (nonalcoholic steatohepatitis)    Gastroesophageal reflux disease with esophagitis without hemorrhage    Other orders  -     fluticasone-salmeterol (ADVAIR) 250-50 MCG/DOSE DISKUS; Inhale.  -     montelukast (SINGULAIR) 10 MG tablet  -      Empagliflozin (Jardiance) 10 MG tablet; TAKE 1 TABLET BY MOUTH EVERY DAY (STOP FARXIGA)          PLAN  Return in about 4 months (around 10/15/2021).    I have discussed the above plan with the patient.  They verbalize understanding and are in agreement with the plan.  They have been advised to contact the office for any questions, concerns, or changes related to their health.

## 2021-11-16 ENCOUNTER — OFFICE VISIT (OUTPATIENT)
Dept: GASTROENTEROLOGY | Facility: CLINIC | Age: 66
End: 2021-11-16

## 2021-11-16 VITALS
HEIGHT: 59 IN | BODY MASS INDEX: 32.01 KG/M2 | WEIGHT: 158.8 LBS | DIASTOLIC BLOOD PRESSURE: 86 MMHG | SYSTOLIC BLOOD PRESSURE: 150 MMHG

## 2021-11-16 DIAGNOSIS — E11.9 TYPE 2 DIABETES MELLITUS WITHOUT COMPLICATION, WITHOUT LONG-TERM CURRENT USE OF INSULIN (HCC): ICD-10-CM

## 2021-11-16 DIAGNOSIS — K75.81 NASH (NONALCOHOLIC STEATOHEPATITIS): Primary | ICD-10-CM

## 2021-11-16 PROCEDURE — 99214 OFFICE O/P EST MOD 30 MIN: CPT | Performed by: INTERNAL MEDICINE

## 2021-11-16 RX ORDER — KETOCONAZOLE 20 MG/G
CREAM TOPICAL
COMMUNITY
Start: 2021-11-09 | End: 2022-03-29

## 2021-11-16 NOTE — PROGRESS NOTES
PATIENT INFORMATION  Renee Jama       - 1955    CHIEF COMPLAINT  Chief Complaint   Patient presents with   • DAS   • Heartburn       HISTORY OF PRESENT ILLNESS  Here for DAS and her BS and Trig are all elevated and her Fibrosuree is slightly up and her colon was normal in 2019 so recall in       REVIEWED PERTINENT RESULTS/ LABS  No results found for: CASEREPORT, FINALDX  Lab Results   Component Value Date    HGB 14.6 2021    MCV 97.4 2021     2021     (H) 2021     (H) 2021    HGBA1C 7.8 (H) 2021    TRIG 190 (H) 2021    FERRITIN 134.1 2014    IRON 112 2019    TIBC 372 2019      No results found.    REVIEW OF SYSTEMS  Review of Systems   Constitutional: Negative for activity change, chills, fever and unexpected weight change.   HENT: Negative for congestion.    Eyes: Negative for visual disturbance.   Respiratory: Negative for shortness of breath.    Cardiovascular: Negative for chest pain and palpitations.   Gastrointestinal: Positive for constipation. Negative for abdominal pain and blood in stool.   Endocrine: Negative for cold intolerance and heat intolerance.   Genitourinary: Negative for hematuria.   Musculoskeletal: Negative for gait problem.   Skin: Negative for color change.   Allergic/Immunologic: Negative for immunocompromised state.   Neurological: Negative for weakness and light-headedness.   Hematological: Negative for adenopathy.   Psychiatric/Behavioral: Negative for sleep disturbance. The patient is not nervous/anxious.          ACTIVE PROBLEMS  Patient Active Problem List    Diagnosis    • Hypothyroidism [E03.9]    • DAS (nonalcoholic steatohepatitis) [K75.81]    • Type 2 diabetes mellitus without complication, without long-term current use of insulin (HCC) [E11.9]          PAST MEDICAL HISTORY  Past Medical History:   Diagnosis Date   • Colon polyp    • Diabetes mellitus (HCC)    • Hyperlipidemia     • Hypertension    • Liver disease          SURGICAL HISTORY  Past Surgical History:   Procedure Laterality Date   • COLONOSCOPY           FAMILY HISTORY  Family History   Problem Relation Age of Onset   • Colon cancer Neg Hx    • Colon polyps Neg Hx          SOCIAL HISTORY  Social History     Occupational History   • Not on file   Tobacco Use   • Smoking status: Never Smoker   • Smokeless tobacco: Never Used   Vaping Use   • Vaping Use: Never used   Substance and Sexual Activity   • Alcohol use: Yes   • Drug use: Not on file   • Sexual activity: Defer         CURRENT MEDICATIONS    Current Outpatient Medications:   •  Aspirin Buf,CaCarb-MgCarb-MgO, 81 MG tablet, Take 81 mg by mouth Daily., Disp: , Rfl:   •  empagliflozin (Jardiance) 25 MG tablet tablet, Take 25 mg by mouth Daily., Disp: , Rfl:   •  fluticasone (FLONASE) 50 MCG/ACT nasal spray, INSTILL 2 SPRAYS IN EACH NOSTRIL DAILY, Disp: , Rfl: 3  •  JANUVIA 50 MG tablet, Take 50 mg by mouth Daily., Disp: , Rfl: 1  •  ketoconazole (NIZORAL) 2 % cream, , Disp: , Rfl:   •  levothyroxine (SYNTHROID, LEVOTHROID) 50 MCG tablet, Take 50 mcg by mouth Daily., Disp: , Rfl: 3  •  metFORMIN (GLUCOPHAGE) 500 MG tablet, Take 500 mg by mouth 2 (Two) Times a Day With Meals., Disp: , Rfl: 3  •  Omega-3 Fatty Acids (FISH OIL) 1000 MG capsule capsule, Take 2 capsules by mouth Daily., Disp: , Rfl:   •  pravastatin (PRAVACHOL) 40 MG tablet, Take 20 mg by mouth Daily., Disp: , Rfl:   •  PROAIR  (90 Base) MCG/ACT inhaler, , Disp: , Rfl:   •  saline 0.65 % nasal solution (BABY AYR) 0.65 % solution, 1 spray into the nostril(s) as directed by provider., Disp: , Rfl:   •  valACYclovir (VALTREX) 1000 MG tablet, TAKE 2 TABLETS BY MOUTH 2 TIMES DAILY FOR 1 DAY. (ENOUGH FOR 3 OUTBREAKS), Disp: , Rfl: 0  •  valsartan-hydrochlorothiazide (DIOVAN-HCT) 320-12.5 MG per tablet, Take 1 tablet by mouth Daily., Disp: , Rfl:     ALLERGIES  Patient has no known allergies.    VITALS  Vitals:     "11/16/21 1051   BP: 150/86   BP Location: Left arm   Patient Position: Sitting   Cuff Size: Adult   Weight: 72 kg (158 lb 12.8 oz)   Height: 149.9 cm (59\")       PHYSICAL EXAM  Debilities/Disabilities Identified: None  Emotional Behavior: Appropriate  Wt Readings from Last 3 Encounters:   11/16/21 72 kg (158 lb 12.8 oz)   06/15/21 68 kg (150 lb)   08/20/19 68 kg (149 lb 14.6 oz)     Ht Readings from Last 1 Encounters:   11/16/21 149.9 cm (59\")     Body mass index is 32.07 kg/m².  Physical Exam  Constitutional:       Appearance: She is well-developed. She is not diaphoretic.   HENT:      Head: Normocephalic and atraumatic.   Eyes:      General: No scleral icterus.     Conjunctiva/sclera: Conjunctivae normal.      Pupils: Pupils are equal, round, and reactive to light.   Neck:      Thyroid: No thyromegaly.   Cardiovascular:      Rate and Rhythm: Normal rate and regular rhythm.      Heart sounds: Normal heart sounds. No murmur heard.  No gallop.    Pulmonary:      Effort: Pulmonary effort is normal.      Breath sounds: Normal breath sounds. No wheezing or rales.   Abdominal:      General: Bowel sounds are normal. There is no distension or abdominal bruit.      Palpations: Abdomen is soft. There is no shifting dullness, fluid wave or mass.      Tenderness: There is no abdominal tenderness. There is no guarding. Negative signs include Ayala's sign.      Hernia: There is no hernia in the ventral area.   Musculoskeletal:         General: Normal range of motion.      Cervical back: Normal range of motion and neck supple.   Lymphadenopathy:      Cervical: No cervical adenopathy.   Skin:     General: Skin is warm and dry.      Findings: No erythema or rash.   Neurological:      Mental Status: She is alert and oriented to person, place, and time.   Psychiatric:         Mood and Affect: Mood normal.         Behavior: Behavior normal.         CLINICAL DATA REVIEWED   reviewed previous lab results and integrated with today's " visit, reviewed notes from other physicians and/or last GI encounter, reviewed previous endoscopy results and available photos, reviewed surgical pathology results from previous biopsies    ASSESSMENT  Diagnoses and all orders for this visit:    DAS (nonalcoholic steatohepatitis)    Type 2 diabetes mellitus without complication, without long-term current use of insulin (HCC)    Other orders  -     empagliflozin (Jardiance) 25 MG tablet tablet; Take 25 mg by mouth Daily.  -     ketoconazole (NIZORAL) 2 % cream          PLAN  Return in about 4 months (around 3/16/2022).    I have discussed the above plan with the patient.  They verbalize understanding and are in agreement with the plan.  They have been advised to contact the office for any questions, concerns, or changes related to their health.

## 2022-03-29 ENCOUNTER — OFFICE VISIT (OUTPATIENT)
Dept: GASTROENTEROLOGY | Facility: CLINIC | Age: 67
End: 2022-03-29

## 2022-03-29 VITALS
BODY MASS INDEX: 30.76 KG/M2 | HEIGHT: 59 IN | DIASTOLIC BLOOD PRESSURE: 82 MMHG | WEIGHT: 152.6 LBS | SYSTOLIC BLOOD PRESSURE: 122 MMHG

## 2022-03-29 DIAGNOSIS — K75.81 NASH (NONALCOHOLIC STEATOHEPATITIS): Primary | ICD-10-CM

## 2022-03-29 DIAGNOSIS — E11.9 TYPE 2 DIABETES MELLITUS WITHOUT COMPLICATION, WITHOUT LONG-TERM CURRENT USE OF INSULIN: ICD-10-CM

## 2022-03-29 DIAGNOSIS — K21.00 GASTROESOPHAGEAL REFLUX DISEASE WITH ESOPHAGITIS WITHOUT HEMORRHAGE: ICD-10-CM

## 2022-03-29 PROCEDURE — 99213 OFFICE O/P EST LOW 20 MIN: CPT | Performed by: INTERNAL MEDICINE

## 2022-03-29 RX ORDER — MONTELUKAST SODIUM 10 MG/1
10 TABLET ORAL
COMMUNITY
Start: 2022-01-16

## 2022-03-29 RX ORDER — MELATONIN
1000 DAILY
COMMUNITY

## 2022-03-29 RX ORDER — PANTOPRAZOLE SODIUM 40 MG/1
40 TABLET, DELAYED RELEASE ORAL DAILY
Qty: 90 TABLET | Refills: 3 | Status: SHIPPED | OUTPATIENT
Start: 2022-03-29 | End: 2022-11-01

## 2022-03-29 RX ORDER — DULAGLUTIDE 0.75 MG/.5ML
INJECTION, SOLUTION SUBCUTANEOUS
COMMUNITY
Start: 2022-03-10 | End: 2022-11-01

## 2022-03-29 NOTE — PROGRESS NOTES
PATIENT INFORMATION  Renee Jama       - 1955    CHIEF COMPLAINT  Chief Complaint   Patient presents with   • DAS   • Heartburn       HISTORY OF PRESENT ILLNESS  Here for NASDh and has DM and cholesterol but despite some wt loss her LFT were up in January she is atable now and off Ptrravastatin  For the last 3 weeks.  So will repeat her LFTs along with a Lipid panel      She now describes HB worsening and isnt on any meds and no dysphagia      REVIEWED PERTINENT RESULTS/ LABS  No results found for: CASEREPORT, FINALDX  Lab Results   Component Value Date    HGB 14.6 2021    MCV 97.4 2021     2021     (H) 2022     (H) 2022    HGBA1C 7.8 (H) 2021    TRIG 190 (H) 2021    FERRITIN 134.1 2014    IRON 112 2019    TIBC 372 2019      No results found.    REVIEW OF SYSTEMS  Review of Systems   Constitutional: Negative for activity change, chills, fever and unexpected weight change.   HENT: Negative for congestion.    Eyes: Negative for visual disturbance.   Respiratory: Negative for shortness of breath.    Cardiovascular: Negative for chest pain and palpitations.   Gastrointestinal: Negative for abdominal pain and blood in stool.   Endocrine: Negative for cold intolerance and heat intolerance.   Genitourinary: Negative for hematuria.   Musculoskeletal: Negative for gait problem.   Skin: Negative for color change.   Allergic/Immunologic: Negative for immunocompromised state.   Neurological: Negative for weakness and light-headedness.   Hematological: Negative for adenopathy.   Psychiatric/Behavioral: Negative for sleep disturbance. The patient is not nervous/anxious.          ACTIVE PROBLEMS  Patient Active Problem List    Diagnosis    • Hypothyroidism [E03.9]    • DAS (nonalcoholic steatohepatitis) [K75.81]    • Type 2 diabetes mellitus without complication, without long-term current use of insulin (HCC) [E11.9]          PAST  MEDICAL HISTORY  Past Medical History:   Diagnosis Date   • Colon polyp    • Diabetes mellitus (HCC)    • Hyperlipidemia    • Hypertension    • Liver disease          SURGICAL HISTORY  Past Surgical History:   Procedure Laterality Date   • COLONOSCOPY           FAMILY HISTORY  Family History   Problem Relation Age of Onset   • Colon cancer Neg Hx    • Colon polyps Neg Hx          SOCIAL HISTORY  Social History     Occupational History   • Not on file   Tobacco Use   • Smoking status: Never Smoker   • Smokeless tobacco: Never Used   Vaping Use   • Vaping Use: Never used   Substance and Sexual Activity   • Alcohol use: Yes   • Drug use: Never   • Sexual activity: Defer         CURRENT MEDICATIONS    Current Outpatient Medications:   •  Aspirin Buf,CaCarb-MgCarb-MgO, 81 MG tablet, Take 81 mg by mouth Daily., Disp: , Rfl:   •  cholecalciferol (VITAMIN D3) 25 MCG (1000 UT) tablet, Take 1,000 Units by mouth Daily., Disp: , Rfl:   •  empagliflozin (JARDIANCE) 25 MG tablet tablet, Take 25 mg by mouth Daily., Disp: , Rfl:   •  fluticasone (FLONASE) 50 MCG/ACT nasal spray, INSTILL 2 SPRAYS IN EACH NOSTRIL DAILY, Disp: , Rfl: 3  •  levothyroxine (SYNTHROID, LEVOTHROID) 50 MCG tablet, Take 50 mcg by mouth Daily., Disp: , Rfl: 3  •  metFORMIN (GLUCOPHAGE) 500 MG tablet, Take 500 mg by mouth 2 (Two) Times a Day With Meals., Disp: , Rfl: 3  •  montelukast (SINGULAIR) 10 MG tablet, Take 10 mg by mouth every night at bedtime., Disp: , Rfl:   •  Omega-3 Fatty Acids (FISH OIL) 1000 MG capsule capsule, Take 2 capsules by mouth Daily., Disp: , Rfl:   •  PROAIR  (90 Base) MCG/ACT inhaler, , Disp: , Rfl:   •  saline 0.65 % nasal solution (BABY AYR) 0.65 % solution, 1 spray into the nostril(s) as directed by provider., Disp: , Rfl:   •  Trulicity 0.75 MG/0.5ML solution pen-injector, INJECT 0.75 MG INTO THE SKIN ONCE A WEEK., Disp: , Rfl:   •  valACYclovir (VALTREX) 1000 MG tablet, TAKE 2 TABLETS BY MOUTH 2 TIMES DAILY FOR 1 DAY.  "(ENOUGH FOR 3 OUTBREAKS), Disp: , Rfl: 0  •  valsartan-hydrochlorothiazide (DIOVAN-HCT) 320-12.5 MG per tablet, Take 1 tablet by mouth Daily., Disp: , Rfl:     ALLERGIES  Patient has no known allergies.    VITALS  Vitals:    03/29/22 1014   BP: 122/82   BP Location: Left arm   Patient Position: Sitting   Cuff Size: Adult   Weight: 69.2 kg (152 lb 9.6 oz)   Height: 149.9 cm (59\")       PHYSICAL EXAM  Debilities/Disabilities Identified: None  Emotional Behavior: Appropriate  Wt Readings from Last 3 Encounters:   03/29/22 69.2 kg (152 lb 9.6 oz)   11/16/21 72 kg (158 lb 12.8 oz)   06/15/21 68 kg (150 lb)     Ht Readings from Last 1 Encounters:   03/29/22 149.9 cm (59\")     Body mass index is 30.82 kg/m².  Physical Exam  Constitutional:       Appearance: She is well-developed. She is not diaphoretic.   HENT:      Head: Normocephalic and atraumatic.   Eyes:      General: No scleral icterus.     Conjunctiva/sclera: Conjunctivae normal.      Pupils: Pupils are equal, round, and reactive to light.   Neck:      Thyroid: No thyromegaly.   Cardiovascular:      Rate and Rhythm: Normal rate and regular rhythm.      Heart sounds: Normal heart sounds. No murmur heard.    No gallop.   Pulmonary:      Effort: Pulmonary effort is normal.      Breath sounds: Normal breath sounds. No wheezing or rales.   Abdominal:      General: Bowel sounds are normal. There is no distension or abdominal bruit.      Palpations: Abdomen is soft. There is no shifting dullness, fluid wave or mass.      Tenderness: There is no abdominal tenderness. There is no guarding. Negative signs include Ayala's sign.      Hernia: There is no hernia in the ventral area.   Musculoskeletal:         General: Normal range of motion.      Cervical back: Normal range of motion and neck supple.   Lymphadenopathy:      Cervical: No cervical adenopathy.   Skin:     General: Skin is warm and dry.      Findings: No erythema or rash.   Neurological:      Mental Status: She is " alert and oriented to person, place, and time.   Psychiatric:         Mood and Affect: Mood normal.         Behavior: Behavior normal.         CLINICAL DATA REVIEWED   reviewed previous lab results and integrated with today's visit, reviewed notes from other physicians and/or last GI encounter, reviewed previous endoscopy results and available photos, reviewed surgical pathology results from previous biopsies    ASSESSMENT  Diagnoses and all orders for this visit:    DAS (nonalcoholic steatohepatitis)  -     Lipid Panel  -     Comprehensive Metabolic Panel    Type 2 diabetes mellitus without complication, without long-term current use of insulin (HCC)    Gastroesophageal reflux disease with esophagitis without hemorrhage    Other orders  -     cholecalciferol (VITAMIN D3) 25 MCG (1000 UT) tablet; Take 1,000 Units by mouth Daily.  -     Trulicity 0.75 MG/0.5ML solution pen-injector; INJECT 0.75 MG INTO THE SKIN ONCE A WEEK.  -     montelukast (SINGULAIR) 10 MG tablet; Take 10 mg by mouth every night at bedtime.          PLAN  Will recheck labs and start a daily PPI    No follow-ups on file.    I have discussed the above plan with the patient.  They verbalize understanding and are in agreement with the plan.  They have been advised to contact the office for any questions, concerns, or changes related to their health.

## 2022-04-20 ENCOUNTER — TELEPHONE (OUTPATIENT)
Dept: GASTROENTEROLOGY | Facility: CLINIC | Age: 67
End: 2022-04-20

## 2022-04-20 NOTE — TELEPHONE ENCOUNTER
Okay to call pt for reschedule per pt request. Will review labs when done can reschedule earlier if provider prefers.

## 2022-04-20 NOTE — TELEPHONE ENCOUNTER
PT CALLED WANTING TO RESCHEDULE APPT.  I GAVE HER NEXT AVAILABLE AT [facility].  PATIENT IS GETTING LABS DONE THIS WEEK , ALTHOUGH SHE IS CONCERNED TO WAIT UNTIL November UNLESS ITS OK TO.

## 2022-05-13 ENCOUNTER — LAB (OUTPATIENT)
Dept: LAB | Facility: HOSPITAL | Age: 67
End: 2022-05-13

## 2022-05-13 LAB
ALBUMIN SERPL-MCNC: 4.3 G/DL (ref 3.5–5.2)
ALBUMIN/GLOB SERPL: 1.8 G/DL
ALP SERPL-CCNC: 83 U/L (ref 39–117)
ALT SERPL W P-5'-P-CCNC: 257 U/L (ref 1–33)
ANION GAP SERPL CALCULATED.3IONS-SCNC: 9 MMOL/L (ref 5–15)
AST SERPL-CCNC: 145 U/L (ref 1–32)
BILIRUB SERPL-MCNC: 0.4 MG/DL (ref 0–1.2)
BUN SERPL-MCNC: 27 MG/DL (ref 8–23)
BUN/CREAT SERPL: 42.9 (ref 7–25)
CALCIUM SPEC-SCNC: 9.8 MG/DL (ref 8.6–10.5)
CHLORIDE SERPL-SCNC: 102 MMOL/L (ref 98–107)
CHOLEST SERPL-MCNC: 247 MG/DL (ref 0–200)
CO2 SERPL-SCNC: 28 MMOL/L (ref 22–29)
CREAT SERPL-MCNC: 0.63 MG/DL (ref 0.57–1)
EGFRCR SERPLBLD CKD-EPI 2021: 97.4 ML/MIN/1.73
GLOBULIN UR ELPH-MCNC: 2.4 GM/DL
GLUCOSE SERPL-MCNC: 163 MG/DL (ref 65–99)
HDLC SERPL-MCNC: 55 MG/DL (ref 40–60)
LDLC SERPL CALC-MCNC: 153 MG/DL (ref 0–100)
LDLC/HDLC SERPL: 2.72 {RATIO}
POTASSIUM SERPL-SCNC: 4.2 MMOL/L (ref 3.5–5.2)
PROT SERPL-MCNC: 6.7 G/DL (ref 6–8.5)
SODIUM SERPL-SCNC: 139 MMOL/L (ref 136–145)
TRIGL SERPL-MCNC: 213 MG/DL (ref 0–150)
VLDLC SERPL-MCNC: 39 MG/DL (ref 5–40)

## 2022-05-13 PROCEDURE — 80061 LIPID PANEL: CPT | Performed by: INTERNAL MEDICINE

## 2022-05-13 PROCEDURE — 80053 COMPREHEN METABOLIC PANEL: CPT | Performed by: INTERNAL MEDICINE

## 2022-05-13 PROCEDURE — 36415 COLL VENOUS BLD VENIPUNCTURE: CPT | Performed by: INTERNAL MEDICINE

## 2022-11-01 ENCOUNTER — OFFICE VISIT (OUTPATIENT)
Dept: GASTROENTEROLOGY | Facility: CLINIC | Age: 67
End: 2022-11-01

## 2022-11-01 VITALS
SYSTOLIC BLOOD PRESSURE: 118 MMHG | WEIGHT: 151 LBS | HEIGHT: 59 IN | BODY MASS INDEX: 30.44 KG/M2 | DIASTOLIC BLOOD PRESSURE: 82 MMHG

## 2022-11-01 DIAGNOSIS — Z86.010 PERSONAL HISTORY OF COLONIC POLYPS: Primary | ICD-10-CM

## 2022-11-01 DIAGNOSIS — K75.81 NASH (NONALCOHOLIC STEATOHEPATITIS): ICD-10-CM

## 2022-11-01 DIAGNOSIS — E11.9 TYPE 2 DIABETES MELLITUS WITHOUT COMPLICATION, WITHOUT LONG-TERM CURRENT USE OF INSULIN: ICD-10-CM

## 2022-11-01 PROCEDURE — 99213 OFFICE O/P EST LOW 20 MIN: CPT | Performed by: INTERNAL MEDICINE

## 2022-11-01 RX ORDER — DULAGLUTIDE 1.5 MG/.5ML
INJECTION, SOLUTION SUBCUTANEOUS
COMMUNITY
Start: 2022-09-01

## 2022-11-01 NOTE — PROGRESS NOTES
PATIENT INFORMATION  Kacy Jama       - 1955    CHIEF COMPLAINT  Chief Complaint   Patient presents with   • Non-alcoholic streatohepatitis   • Heartburn   • Diabetes mellitus       HISTORY OF PRESENT ILLNESS  Here for DAS and COlon plyops    Her last normal Colon was  so due in 2024    Injured her left wrist while walking but is still out trying to lose weight    Reviewed her DM. Trig and her DAS so is to focxus on her HGBN A1C- and the rest will follow.     Her labs in [facility] and were not much improved but her BS appear to be improving so encouraged that persistence will be her best       REVIEWED PERTINENT RESULTS/ LABS  No results found for: CASEREPORT, FINALDX  Lab Results   Component Value Date    HGB 14.6 2021    MCV 97.4 2021     2021     (H) 2022     (H) 2022    HGBA1C 6.9 (H) 2022    TRIG 213 (H) 2022    FERRITIN 134.1 2014    IRON 112 2019    TIBC 372 2019      XR Wrist Comp Min 3 Vw LT    Result Date: 10/15/2022  Narrative: REVIEWING YOUR TEST RESULTS IN [facility] IS NOT A SUBSTITUTE FOR DISCUSSING THOSE RESULTS WITH YOUR HEALTH CARE PROVIDER. PLEASE CONTA[facility] YOUR PROVIDER [facility] TO DISCUSS ANY QUESTIONS OR CONCERNS YOU MAY HAVE REGARDING THESE TEST RESULTS.  RADIOLOGY REPORT FACILITY:  [place] [facility] UNIT/AGE/GENDER: EFRAÍNICCNB  OP      AGE:67 Y          SEX:F PATIENT NAME/:  KACY JAMA A    1955 UNIT NUMBER:  [id number] ACCOUNT NUMBER:  [id number] ACCESSION NUMBER:  [id number] XR WRIST COMP MIN 3 VW LT DATE: 10/15/2022 6:35 PM INDICATION: FOOSH and pain in ant snuffbox.  fell onto concrete. COMPARISON: May 3, 2018 IMPRESSION: 1.No convincing evidence of acute fracture. There is a cortical irregularity of the lateral aspect of the radial metaphysis which is new from the prior but is seen only on a single view. This is favored to be chronic. Normal alignment.  2.Degenerative change greatest of the thumb carpal metacarpal joint. Dictated by: Tony King M.D. Images and Report reviewed and interpreted by: Tony King M.D. <PS><Electronically signed by: Tony King M.D.> 10/15/2022 1858 D: 10/15/2022 1857 T: 10/15/2022 1857      REVIEW OF SYSTEMS  Review of Systems   Constitutional: Negative for activity change, chills, fever and unexpected weight change.   HENT: Negative for congestion.    Eyes: Negative for visual disturbance.   Respiratory: Negative for shortness of breath.    Cardiovascular: Negative for chest pain and palpitations.   Gastrointestinal: Negative for abdominal pain and blood in stool.   Endocrine: Negative for cold intolerance and heat intolerance.   Genitourinary: Negative for hematuria.   Musculoskeletal: Negative for gait problem.   Skin: Negative for color change.   Allergic/Immunologic: Negative for immunocompromised state.   Neurological: Negative for weakness and light-headedness.   Hematological: Negative for adenopathy.   Psychiatric/Behavioral: Negative for sleep disturbance. The patient is not nervous/anxious.          ACTIVE PROBLEMS  Patient Active Problem List    Diagnosis    • Hypothyroidism [E03.9]    • DAS (nonalcoholic steatohepatitis) [K75.81]    • Type 2 diabetes mellitus without complication, without long-term current use of insulin (HCC) [E11.9]          PAST MEDICAL HISTORY  Past Medical History:   Diagnosis Date   • Colon polyp    • Diabetes mellitus (HCC)    • Hyperlipidemia    • Hypertension    • Liver disease          SURGICAL HISTORY  Past Surgical History:   Procedure Laterality Date   • COLONOSCOPY           FAMILY HISTORY  Family History   Problem Relation Age of Onset   • Colon cancer Neg Hx    • Colon polyps Neg Hx          SOCIAL HISTORY  Social History     Occupational History   • Not on file   Tobacco Use   • Smoking status: Never   • Smokeless tobacco: Never   Vaping Use   • Vaping Use: Never used   Substance and  "Sexual Activity   • Alcohol use: Yes   • Drug use: Never   • Sexual activity: Defer         CURRENT MEDICATIONS    Current Outpatient Medications:   •  Aspirin Buf,CaCarb-MgCarb-MgO, 81 MG tablet, Take 81 mg by mouth Daily., Disp: , Rfl:   •  cholecalciferol (VITAMIN D3) 25 MCG (1000 UT) tablet, Take 1,000 Units by mouth Daily., Disp: , Rfl:   •  empagliflozin (JARDIANCE) 25 MG tablet tablet, Take 25 mg by mouth Daily., Disp: , Rfl:   •  levothyroxine (SYNTHROID, LEVOTHROID) 50 MCG tablet, Take 50 mcg by mouth Daily., Disp: , Rfl: 3  •  metFORMIN (GLUCOPHAGE) 500 MG tablet, Take 500 mg by mouth 2 (Two) Times a Day With Meals., Disp: , Rfl: 3  •  montelukast (SINGULAIR) 10 MG tablet, Take 10 mg by mouth every night at bedtime., Disp: , Rfl:   •  Omega-3 Fatty Acids (FISH OIL) 1000 MG capsule capsule, Take 2 capsules by mouth Daily., Disp: , Rfl:   •  PROAIR  (90 Base) MCG/ACT inhaler, , Disp: , Rfl:   •  saline 0.65 % nasal solution (BABY AYR) 0.65 % solution, 1 spray into the nostril(s) as directed by provider., Disp: , Rfl:   •  Trulicity 1.5 MG/0.5ML solution pen-injector, INJECT 1.5 MG INTO THE SKIN WEEKLY, Disp: , Rfl:   •  valACYclovir (VALTREX) 1000 MG tablet, TAKE 2 TABLETS BY MOUTH 2 TIMES DAILY FOR 1 DAY. (ENOUGH FOR 3 OUTBREAKS), Disp: , Rfl: 0  •  valsartan-hydrochlorothiazide (DIOVAN-HCT) 320-12.5 MG per tablet, Take 1 tablet by mouth Daily., Disp: , Rfl:     ALLERGIES  Patient has no known allergies.    VITALS  Vitals:    11/01/22 0945   BP: 118/82   BP Location: Right arm   Patient Position: Sitting   Cuff Size: Adult   Weight: 68.5 kg (151 lb)   Height: 149.9 cm (59\")       PHYSICAL EXAM  Debilities/Disabilities Identified: None  Emotional Behavior: Appropriate  Wt Readings from Last 3 Encounters:   11/01/22 68.5 kg (151 lb)   03/29/22 69.2 kg (152 lb 9.6 oz)   11/16/21 72 kg (158 lb 12.8 oz)     Ht Readings from Last 1 Encounters:   11/01/22 149.9 cm (59\")     Body mass index is 30.5 " kg/m².  Physical Exam  Constitutional:       Appearance: She is well-developed. She is not diaphoretic.   HENT:      Head: Normocephalic and atraumatic.   Eyes:      General: No scleral icterus.     Conjunctiva/sclera: Conjunctivae normal.      Pupils: Pupils are equal, round, and reactive to light.   Neck:      Thyroid: No thyromegaly.   Cardiovascular:      Rate and Rhythm: Normal rate and regular rhythm.      Heart sounds: Normal heart sounds. No murmur heard.    No gallop.   Pulmonary:      Effort: Pulmonary effort is normal.      Breath sounds: Normal breath sounds. No wheezing or rales.   Abdominal:      General: Bowel sounds are normal. There is no distension or abdominal bruit.      Palpations: Abdomen is soft. There is no shifting dullness, fluid wave or mass.      Tenderness: There is no abdominal tenderness. There is no guarding. Negative signs include Ayala's sign.      Hernia: There is no hernia in the ventral area.   Musculoskeletal:         General: Normal range of motion.      Cervical back: Normal range of motion and neck supple.   Lymphadenopathy:      Cervical: No cervical adenopathy.   Skin:     General: Skin is warm and dry.      Findings: No erythema or rash.   Neurological:      Mental Status: She is alert and oriented to person, place, and time.   Psychiatric:         Mood and Affect: Mood normal.         Behavior: Behavior normal.         CLINICAL DATA REVIEWED   reviewed previous lab results and integrated with today's visit, reviewed notes from other physicians and/or last GI encounter, reviewed previous endoscopy results and available photos, reviewed surgical pathology results from previous biopsies    ASSESSMENT  Diagnoses and all orders for this visit:    Personal history of colonic polyps    Type 2 diabetes mellitus without complication, without long-term current use of insulin (HCC)    DAS (nonalcoholic steatohepatitis)    Other orders  -     Trulicity 1.5 MG/0.5ML solution  pen-injector; INJECT 1.5 MG INTO THE SKIN WEEKLY          PLAN    Return in about 6 months (around 5/1/2023).    I have discussed the above plan with the patient.  They verbalize understanding and are in agreement with the plan.  They have been advised to contact the office for any questions, concerns, or changes related to their health.

## 2023-10-31 ENCOUNTER — OFFICE VISIT (OUTPATIENT)
Dept: ORTHOPEDIC SURGERY | Facility: CLINIC | Age: 68
End: 2023-10-31
Payer: MEDICARE

## 2023-10-31 VITALS — BODY MASS INDEX: 30.32 KG/M2 | TEMPERATURE: 98.4 F | WEIGHT: 150.4 LBS | HEIGHT: 59 IN

## 2023-10-31 DIAGNOSIS — M17.12 PRIMARY OSTEOARTHRITIS OF LEFT KNEE: Primary | ICD-10-CM

## 2023-10-31 PROCEDURE — 99204 OFFICE O/P NEW MOD 45 MIN: CPT | Performed by: ORTHOPAEDIC SURGERY

## 2023-10-31 NOTE — PROGRESS NOTES
Patient: Renee Jama  YOB: 1955 68 y.o. female  Medical Record Number: [id number]    Chief Complaints:   Chief Complaint   Patient presents with    Left Knee - Initial Evaluation       History of Present Illness:Renee Jama is a 68 y.o. female who presents with complaints of posterior left knee pain she states she overall gets around well walking but when she has stairs or flexes the knee is significantly she has fairly severe posterior medial knee pain.  She was seen at another orthopedic surgeons office and is here today for further evaluation.    Allergies: No Known Allergies    Medications:   Current Outpatient Medications   Medication Sig Dispense Refill    Aspirin Buf,CaCarb-MgCarb-MgO, 81 MG tablet Take 81 mg by mouth Daily.      cholecalciferol (VITAMIN D3) 25 MCG (1000 UT) tablet Take 1 tablet by mouth Daily.      empagliflozin (JARDIANCE) 25 MG tablet tablet Take 1 tablet by mouth Daily.      levothyroxine (SYNTHROID, LEVOTHROID) 50 MCG tablet Take 1 tablet by mouth Daily.  3    metFORMIN (GLUCOPHAGE) 500 MG tablet Take 1 tablet by mouth 2 (Two) Times a Day With Meals.  3    montelukast (SINGULAIR) 10 MG tablet Take 1 tablet by mouth every night at bedtime.      Omega-3 Fatty Acids (FISH OIL) 1000 MG capsule capsule Take 2 capsules by mouth Daily.      pioglitazone (ACTOS) 15 MG tablet Take 1 tablet by mouth Daily.      PROAIR  (90 Base) MCG/ACT inhaler       Trulicity 4.5 MG/0.5ML solution pen-injector Inject 0.5 mL under the skin into the appropriate area as directed.      valACYclovir (VALTREX) 1000 MG tablet TAKE 2 TABLETS BY MOUTH 2 TIMES DAILY FOR 1 DAY. (ENOUGH FOR 3 OUTBREAKS)  0    valsartan-hydrochlorothiazide (DIOVAN-HCT) 320-12.5 MG per tablet Take 1 tablet by mouth Daily.      saline 0.65 % nasal solution (BABY AYR) 0.65 % solution 1 spray into the nostril(s) as directed by provider.       No current facility-administered medications for this visit.         The  "following portions of the patient's history were reviewed and updated as appropriate: allergies, current medications, past family history, past medical history, past social history, past surgical history and problem list.    Review of Systems:   Pertinent positives/negatives listed in HPI above    Physical Exam:   Vitals:    10/31/23 1028   Temp: 98.4 °F (36.9 °C)   Weight: 68.2 kg (150 lb 6.4 oz)   Height: 149.9 cm (59\")   PainSc:   9   PainLoc: Knee       General: A and O x 3, ASA, NAD      Knee Exam List: Knee:  left    ALIGNMENT:     Neutral  ,   Patella tracks   midline    GAIT:    Minimally antalgic    SKIN:    No abnormality    RANGE OF MOTION:   Painful flexion past 95 or 100 degrees    STRENGTH:   5 / 5    LIGAMENTS:    No varus / valgus instability.   Negative  Lachman.    MENISCUS:     Positive  medial   Herny       DISTAL PULSES:    Paplable    DISTAL SENSATION :   Intact    LYMPHATICS:     No   lymphadenopathy    OTHER:          - Positive tRace effusion      - No crepitance with ROM        Radiology:  Xrays 3views of knee (ap,lateral, sunrise) taken previously demonstrating minimal arthritic findings  Reviewed MRI of the left knee which does show large cyst about the posterior medial condyle with reactive bone marrow edema.  Assessment/Plan: Knee posterior medial femoral condyle cyst is quite unusual may end up needing a knee replacement down the road given her symptoms but at this point we will continue with conservative measures.  Offered an injection which she like to hold off on.  I will see her back as needed should her symptoms worsen      There are no diagnoses linked to this encounter.     Tao Segura MD  10/31/2023  "

## 2023-11-01 ENCOUNTER — PATIENT ROUNDING (BHMG ONLY) (OUTPATIENT)
Dept: ORTHOPEDIC SURGERY | Facility: CLINIC | Age: 68
End: 2023-11-01
Payer: MEDICARE

## 2023-11-01 NOTE — PROGRESS NOTES
A Company Message has been sent to the patient for PATIENT ROUNDING with [facility]

## 2023-11-03 ENCOUNTER — CLINICAL SUPPORT (OUTPATIENT)
Dept: ORTHOPEDIC SURGERY | Facility: CLINIC | Age: 68
End: 2023-11-03
Payer: MEDICARE

## 2023-11-03 VITALS — WEIGHT: 152 LBS | TEMPERATURE: 96.9 F | BODY MASS INDEX: 30.64 KG/M2 | HEIGHT: 59 IN

## 2023-11-03 DIAGNOSIS — R52 PAIN: Primary | ICD-10-CM

## 2023-11-03 DIAGNOSIS — M17.12 PRIMARY OSTEOARTHRITIS OF LEFT KNEE: ICD-10-CM

## 2023-11-03 RX ORDER — LIDOCAINE HYDROCHLORIDE 10 MG/ML
2 INJECTION, SOLUTION EPIDURAL; INFILTRATION; INTRACAUDAL; PERINEURAL
Status: COMPLETED | OUTPATIENT
Start: 2023-11-03 | End: 2023-11-03

## 2023-11-03 RX ORDER — METHYLPREDNISOLONE ACETATE 80 MG/ML
80 INJECTION, SUSPENSION INTRA-ARTICULAR; INTRALESIONAL; INTRAMUSCULAR; SOFT TISSUE
Status: COMPLETED | OUTPATIENT
Start: 2023-11-03 | End: 2023-11-03

## 2023-11-03 RX ADMIN — METHYLPREDNISOLONE ACETATE 80 MG: 80 INJECTION, SUSPENSION INTRA-ARTICULAR; INTRALESIONAL; INTRAMUSCULAR; SOFT TISSUE at 12:38

## 2023-11-03 RX ADMIN — LIDOCAINE HYDROCHLORIDE 2 ML: 10 INJECTION, SOLUTION EPIDURAL; INFILTRATION; INTRACAUDAL; PERINEURAL at 12:38

## 2023-11-03 NOTE — PROGRESS NOTES
11/3/2023    Renee Jama is here today for worsening knee pain. Pt has undergone injection of the knee in the past with good resolution of symptoms. Pt is requesting a repeat injection.     KNEE Injection Procedure Note:    Large Joint Arthrocentesis: L knee  Date/Time: 11/3/2023 12:38 PM  Consent given by: patient  Site marked: site marked  Timeout: Immediately prior to procedure a time out was called to verify the correct patient, procedure, equipment, support staff and site/side marked as required   Supporting Documentation  Indications: pain   Procedure Details  Location: knee - L knee  Preparation: Patient was prepped and draped in the usual sterile fashion  Needle gauge: 21G.  Approach: anterolateral  Medications administered: 80 mg methylPREDNISolone acetate 80 MG/ML; 2 mL lidocaine PF 1% 1 %  Patient tolerance: patient tolerated the procedure well with no immediate complications      At the conclusion of the injection I discussed the importance of continued quad strengthening exercises on a daily basis. I will see the patient back if the symptoms should fail to improve or worsen.    Char Guaman, APRN    11/3/2023     3 views of left knee were ordered and reviewed today secondary to pain show significant osteoarthritis.  Comparative views are unchanged

## 2024-02-08 ENCOUNTER — OFFICE VISIT (OUTPATIENT)
Dept: GASTROENTEROLOGY | Facility: CLINIC | Age: 69
End: 2024-02-08
Payer: MEDICARE

## 2024-02-08 ENCOUNTER — LAB (OUTPATIENT)
Dept: LAB | Facility: HOSPITAL | Age: 69
End: 2024-02-08
Payer: MEDICARE

## 2024-02-08 VITALS
HEIGHT: 59 IN | SYSTOLIC BLOOD PRESSURE: 128 MMHG | DIASTOLIC BLOOD PRESSURE: 82 MMHG | BODY MASS INDEX: 30.64 KG/M2 | WEIGHT: 152 LBS

## 2024-02-08 DIAGNOSIS — R79.89 ELEVATED LFTS: Primary | ICD-10-CM

## 2024-02-08 DIAGNOSIS — K75.81 NASH (NONALCOHOLIC STEATOHEPATITIS): ICD-10-CM

## 2024-02-08 DIAGNOSIS — R79.89 ELEVATED LFTS: ICD-10-CM

## 2024-02-08 LAB
IGA1 MFR SER: 180 MG/DL (ref 70–400)
IGG1 SER-MCNC: 628 MG/DL (ref 700–1600)
IGM SERPL-MCNC: 177 MG/DL (ref 40–230)
IRON 24H UR-MRATE: 89 MCG/DL (ref 37–145)
IRON SATN MFR SERPL: 17 % (ref 20–50)
TIBC SERPL-MCNC: 526 MCG/DL (ref 298–536)
TRANSFERRIN SERPL-MCNC: 353 MG/DL (ref 200–360)

## 2024-02-08 PROCEDURE — 36415 COLL VENOUS BLD VENIPUNCTURE: CPT

## 2024-02-08 PROCEDURE — 82104 ALPHA-1-ANTITRYPSIN PHENO: CPT

## 2024-02-08 PROCEDURE — 86381 MITOCHONDRIAL ANTIBODY EACH: CPT

## 2024-02-08 PROCEDURE — 82103 ALPHA-1-ANTITRYPSIN TOTAL: CPT

## 2024-02-08 PROCEDURE — 82784 ASSAY IGA/IGD/IGG/IGM EACH: CPT

## 2024-02-08 PROCEDURE — 83540 ASSAY OF IRON: CPT

## 2024-02-08 PROCEDURE — 84466 ASSAY OF TRANSFERRIN: CPT

## 2024-02-08 RX ORDER — ASPIRIN 81 MG/1
81 TABLET ORAL DAILY
COMMUNITY

## 2024-02-08 RX ORDER — BLOOD SUGAR DIAGNOSTIC
1 STRIP MISCELLANEOUS
COMMUNITY
Start: 2023-12-22

## 2024-02-08 RX ORDER — EPINEPHRINE 0.3 MG/.3ML
INJECTION SUBCUTANEOUS
COMMUNITY
Start: 2023-12-04

## 2024-02-08 RX ORDER — ATORVASTATIN CALCIUM 10 MG/1
10 TABLET, FILM COATED ORAL DAILY
COMMUNITY
Start: 2023-11-21 | End: 2024-02-19

## 2024-02-08 NOTE — PROGRESS NOTES
"    PATIENT INFORMATION  Renee Jama       - 1955    CHIEF COMPLAINT  Chief Complaint   Patient presents with    Non alcoholic steatohepatitis       HISTORY OF PRESENT ILLNESS    Here today for DAS follow-up    DAS: No weight loss, AST/ALT 42/58 last month, last A1C 6.0, trig normal. Trulicity for a year+. Resumed actos. Reviewed weight loss. Plan to recheck labs at follow-up with weight loss. Serologies to rule out other causes, fibroscan to assess for scarring.    3/4/21 last fibrosure F0-F1, S0-S1, N0    2023 Last CT with Fatty liver    2014 Colon with 1/2 adenomas  2019 normal colon      REVIEWED PERTINENT RESULTS/ LABS  No results found for: \"CASEREPORT\", \"FINALDX\"  Lab Results   Component Value Date    HGB 16.0 2022    MCV 99.4 2022     2022    ALT 58 (H) 2023    AST 42 (H) 2023    HGBA1C 6.0 (H) 10/17/2023    TRIG 213 (H) 2022    FERRITIN 134.1 2014    IRON 112 2019    TIBC 372 2019      No results found.    REVIEW OF SYSTEMS  Review of Systems   Constitutional:  Negative for activity change, chills, fever and unexpected weight change.   HENT:  Negative for congestion.    Eyes:  Negative for visual disturbance.   Respiratory:  Negative for shortness of breath.    Cardiovascular:  Negative for chest pain and palpitations.   Gastrointestinal:  Negative for abdominal pain and blood in stool.   Endocrine: Negative for cold intolerance and heat intolerance.   Genitourinary:  Negative for hematuria.   Musculoskeletal:  Negative for gait problem.   Skin:  Negative for color change.   Allergic/Immunologic: Negative for immunocompromised state.   Neurological:  Negative for weakness and light-headedness.   Hematological:  Negative for adenopathy.   Psychiatric/Behavioral:  Negative for sleep disturbance. The patient is not nervous/anxious.          ACTIVE PROBLEMS  Patient Active Problem List    Diagnosis     Hypothyroidism " [E03.9]     DAS (nonalcoholic steatohepatitis) [K75.81]     Type 2 diabetes mellitus without complication, without long-term current use of insulin [E11.9]          PAST MEDICAL HISTORY  Past Medical History:   Diagnosis Date    Colon polyp     Diabetes mellitus     Fatty liver     Hyperlipidemia     Hypertension     Liver disease          SURGICAL HISTORY  Past Surgical History:   Procedure Laterality Date    COLONOSCOPY           FAMILY HISTORY  Family History   Problem Relation Age of Onset    Liver disease Father     Colon cancer Neg Hx     Colon polyps Neg Hx          SOCIAL HISTORY  Social History     Occupational History    Not on file   Tobacco Use    Smoking status: Former     Packs/day: 1.00     Years: 5.00     Additional pack years: 0.00     Total pack years: 5.00     Types: Cigarettes    Smokeless tobacco: Never    Tobacco comments:     From age 16-age 21   Vaping Use    Vaping Use: Never used   Substance and Sexual Activity    Alcohol use: Yes     Comment: 2-4 glasses of wine or coctail a week    Drug use: Never    Sexual activity: Yes     Partners: Male     Birth control/protection: Post-menopausal         CURRENT MEDICATIONS    Current Outpatient Medications:     aspirin 81 MG EC tablet, Take 1 tablet by mouth Daily., Disp: , Rfl:     atorvastatin (LIPITOR) 10 MG tablet, Take 1 tablet by mouth Daily., Disp: , Rfl:     empagliflozin (JARDIANCE) 25 MG tablet tablet, Take 1 tablet by mouth Daily., Disp: , Rfl:     EPINEPHrine (EPIPEN) 0.3 MG/0.3ML solution auto-injector injection, USE AS DIRECTED FOR ACUTE ALLERGIC REACTION, Disp: , Rfl:     levothyroxine (SYNTHROID, LEVOTHROID) 50 MCG tablet, Take 1 tablet by mouth Daily., Disp: , Rfl: 3    metFORMIN (GLUCOPHAGE) 500 MG tablet, Take 1 tablet by mouth 2 (Two) Times a Day With Meals., Disp: , Rfl: 3    montelukast (SINGULAIR) 10 MG tablet, Take 1 tablet by mouth every night at bedtime., Disp: , Rfl:     Omega-3 Fatty Acids (FISH OIL) 1000 MG capsule  "capsule, Take 2 capsules by mouth Daily., Disp: , Rfl:     OneTouch Verio test strip, 1 each by Other route., Disp: , Rfl:     pioglitazone (ACTOS) 15 MG tablet, Take 1 tablet by mouth Daily., Disp: , Rfl:     PROAIR  (90 Base) MCG/ACT inhaler, , Disp: , Rfl:     valACYclovir (VALTREX) 1000 MG tablet, TAKE 2 TABLETS BY MOUTH 2 TIMES DAILY FOR 1 DAY. (ENOUGH FOR 3 OUTBREAKS), Disp: , Rfl: 0    valsartan-hydrochlorothiazide (DIOVAN-HCT) 320-12.5 MG per tablet, Take 1 tablet by mouth 2 (Two) Times a Day., Disp: , Rfl:     ALLERGIES  Patient has no known allergies.    VITALS  Vitals:    02/08/24 0903   BP: 128/82   BP Location: Left arm   Patient Position: Sitting   Cuff Size: Large Adult   Weight: 68.9 kg (152 lb)   Height: 149.9 cm (59\")       PHYSICAL EXAM  Debilities/Disabilities Identified: None  Emotional Behavior: Appropriate  Wt Readings from Last 3 Encounters:   02/08/24 68.9 kg (152 lb)   11/03/23 68.9 kg (152 lb)   10/31/23 68.2 kg (150 lb 6.4 oz)     Ht Readings from Last 1 Encounters:   02/08/24 149.9 cm (59\")     Body mass index is 30.7 kg/m².  Physical Exam  Constitutional:       General: She is not in acute distress.     Appearance: Normal appearance. She is not ill-appearing.   HENT:      Head: Normocephalic and atraumatic.      Mouth/Throat:      Mouth: Mucous membranes are moist.      Pharynx: No posterior oropharyngeal erythema.   Eyes:      General: No scleral icterus.  Cardiovascular:      Rate and Rhythm: Normal rate and regular rhythm.      Heart sounds: Normal heart sounds.   Pulmonary:      Effort: Pulmonary effort is normal.      Breath sounds: Normal breath sounds.   Abdominal:      General: Abdomen is flat. Bowel sounds are normal. There is no distension.      Palpations: Abdomen is soft. There is no mass.      Tenderness: There is no abdominal tenderness. There is no guarding or rebound. Negative signs include Ayala's sign.      Hernia: No hernia is present.   Musculoskeletal:      " Cervical back: Neck supple.   Skin:     General: Skin is warm.      Capillary Refill: Capillary refill takes less than 2 seconds.   Neurological:      General: No focal deficit present.      Mental Status: She is alert and oriented to person, place, and time.   Psychiatric:         Mood and Affect: Mood normal.         Behavior: Behavior normal.         Thought Content: Thought content normal.         Judgment: Judgment normal.         CLINICAL DATA REVIEWED   reviewed previous lab results and integrated with today's visit, reviewed notes from other physicians and/or last GI encounter, reviewed previous endoscopy results and available photos, reviewed surgical pathology results from previous biopsies    ASSESSMENT  Diagnoses and all orders for this visit:    Elevated LFTs  -     US Elastography Parenchyma; Future  -     Mitochondrial Antibodies, M2  -     IgG; Future  -     IgM; Future  -     IgA; Future  -     Alpha - 1 - Antitrypsin Phenotype  -     Iron Profile    DAS (nonalcoholic steatohepatitis)    Other orders  -     aspirin 81 MG EC tablet; Take 1 tablet by mouth Daily.  -     atorvastatin (LIPITOR) 10 MG tablet; Take 1 tablet by mouth Daily.  -     EPINEPHrine (EPIPEN) 0.3 MG/0.3ML solution auto-injector injection; USE AS DIRECTED FOR ACUTE ALLERGIC REACTION  -     OneTouch Verio test strip; 1 each by Other route.          PLAN    Liver serologies  Weight loss, check LFTs at follow-up  Fibroscan    Return in about 4 months (around 6/8/2024).    I have discussed the above plan with the patient.  They verbalize understanding and are in agreement with the plan.  They have been advised to contact the office for any questions, concerns, or changes related to their health.

## 2024-02-09 LAB — MITOCHONDRIA M2 IGG SER-ACNC: <20 UNITS (ref 0–20)

## 2024-02-19 LAB
A1AT PHENOTYP SERPL IFE: NORMAL
A1AT SERPL-MCNC: 129 MG/DL (ref 101–187)

## 2024-04-03 ENCOUNTER — TELEPHONE (OUTPATIENT)
Dept: GASTROENTEROLOGY | Facility: CLINIC | Age: 69
End: 2024-04-03
Payer: MEDICARE

## 2024-04-03 NOTE — TELEPHONE ENCOUNTER
for pt to call for result.    ----- Message from JEWEL Arcos sent at 4/3/2024 11:48 AM EDT -----  Regarding: Fibroscan results  Fibroscan consistent with S3 Severe steatosis, F0-1 means there may be some mild scarring. Scarring can progress to cirrhosis, so need to work on weight loss with a goal of 10-15 lbs.

## 2024-04-03 NOTE — TELEPHONE ENCOUNTER
----- Message from JEWEL Arcos sent at 4/3/2024 11:48 AM EDT -----  Regarding: Fibroscan results  Fibroscan consistent with S3 Severe steatosis, F0-1 means there may be some mild scarring. Scarring can progress to cirrhosis, so need to work on weight loss with a goal of 10-15 lbs.

## 2024-06-10 ENCOUNTER — OFFICE VISIT (OUTPATIENT)
Dept: GASTROENTEROLOGY | Facility: CLINIC | Age: 69
End: 2024-06-10
Payer: MEDICARE

## 2024-06-10 VITALS
WEIGHT: 148.8 LBS | BODY MASS INDEX: 30 KG/M2 | HEIGHT: 59 IN | SYSTOLIC BLOOD PRESSURE: 118 MMHG | DIASTOLIC BLOOD PRESSURE: 82 MMHG

## 2024-06-10 DIAGNOSIS — K75.81 NASH (NONALCOHOLIC STEATOHEPATITIS): Primary | ICD-10-CM

## 2024-06-10 DIAGNOSIS — Z86.010 PERSONAL HISTORY OF COLONIC POLYPS: ICD-10-CM

## 2024-06-10 PROCEDURE — 99214 OFFICE O/P EST MOD 30 MIN: CPT

## 2024-06-10 PROCEDURE — 1160F RVW MEDS BY RX/DR IN RCRD: CPT

## 2024-06-10 PROCEDURE — 1159F MED LIST DOCD IN RCRD: CPT

## 2024-06-10 RX ORDER — FLUTICASONE PROPIONATE AND SALMETEROL 250; 50 UG/1; UG/1
POWDER RESPIRATORY (INHALATION) AS NEEDED
COMMUNITY

## 2024-06-10 RX ORDER — MELATONIN
1000
COMMUNITY

## 2024-06-10 RX ORDER — ATORVASTATIN CALCIUM 10 MG/1
10 TABLET, FILM COATED ORAL DAILY
COMMUNITY
Start: 2024-05-08

## 2024-06-10 NOTE — PROGRESS NOTES
"    PATIENT INFORMATION  Renee Jama       - 1955    CHIEF COMPLAINT  Chief Complaint   Patient presents with    Non-alcoholic steatohepatitis       HISTORY OF PRESENT ILLNESS    Here today for DAS follow-up     DAS: No weight loss, AST/ALT 42/58 last month, last A1C 6.0, trig normal. Trulicity for a year+. Resumed actos. Reviewed weight loss. Fibroscan with F0-1.     3/4/21 last fibrosure F0-F1, S0-S1, N0.     TODAY: Did join [employer], has lost 4.5 lbs in 2 mos, not tracking right now, so encouraged to resume, ozempic not helped on high dose.     2023 Last CT with Fatty liver     2014 Colon with 1/2 adenomas  2019 normal colon        REVIEWED PERTINENT RESULTS/ LABS  No results found for: \"CASEREPORT\", \"FINALDX\"  Lab Results   Component Value Date    HGB 16.0 2022    MCV 99.4 2022     2022    ALT 44 (H) 2024    AST 35 2024    HGBA1C 6.1 (H) 2024    TRIG 213 (H) 2022    FERRITIN 134.1 2014    IRON 89 2024    TIBC 526 2024      No results found.    REVIEW OF SYSTEMS  Review of Systems   Constitutional:  Negative for activity change, chills, fever and unexpected weight change.   HENT:  Negative for congestion.    Eyes:  Negative for visual disturbance.   Respiratory:  Negative for shortness of breath.    Cardiovascular:  Negative for chest pain and palpitations.   Gastrointestinal:  Negative for abdominal pain and blood in stool.   Endocrine: Negative for cold intolerance and heat intolerance.   Genitourinary:  Negative for hematuria.   Musculoskeletal:  Negative for gait problem.   Skin:  Negative for color change.   Allergic/Immunologic: Negative for immunocompromised state.   Neurological:  Negative for weakness and light-headedness.   Hematological:  Negative for adenopathy.   Psychiatric/Behavioral:  Negative for sleep disturbance. The patient is not nervous/anxious.          ACTIVE PROBLEMS  Patient Active " Problem List    Diagnosis     Hypothyroidism [E03.9]     DAS (nonalcoholic steatohepatitis) [K75.81]     Type 2 diabetes mellitus without complication, without long-term current use of insulin [E11.9]          PAST MEDICAL HISTORY  Past Medical History:   Diagnosis Date    Colon polyp     Diabetes mellitus     Fatty liver     Hyperlipidemia     Hypertension     Liver disease          SURGICAL HISTORY  Past Surgical History:   Procedure Laterality Date    COLONOSCOPY           FAMILY HISTORY  Family History   Problem Relation Age of Onset    Liver disease Father     Colon cancer Neg Hx     Colon polyps Neg Hx          SOCIAL HISTORY  Social History     Occupational History    Not on file   Tobacco Use    Smoking status: Former     Current packs/day: 1.00     Average packs/day: 1 pack/day for 5.0 years (5.0 ttl pk-yrs)     Types: Cigarettes    Smokeless tobacco: Never    Tobacco comments:     From age 16-age 21   Vaping Use    Vaping status: Never Used   Substance and Sexual Activity    Alcohol use: Yes     Comment: 1-2 glasses of wine or coctail a week    Drug use: Never    Sexual activity: Yes     Partners: Male     Birth control/protection: Post-menopausal         CURRENT MEDICATIONS    Current Outpatient Medications:     aspirin 81 MG EC tablet, Take 1 tablet by mouth Daily., Disp: , Rfl:     atorvastatin (LIPITOR) 10 MG tablet, Take 1 tablet by mouth Daily., Disp: , Rfl:     Calcium Carbonate-Vit D-Min (CALCIUM 1200 PO), Take  by mouth Daily., Disp: , Rfl:     cholecalciferol (Vitamin D, Cholecalciferol,) 25 MCG (1000 UT) tablet, Take 1 tablet by mouth Every 3 (Three) Days., Disp: , Rfl:     empagliflozin (JARDIANCE) 25 MG tablet tablet, Take 1 tablet by mouth Daily., Disp: , Rfl:     EPINEPHrine (EPIPEN) 0.3 MG/0.3ML solution auto-injector injection, USE AS DIRECTED FOR ACUTE ALLERGIC REACTION, Disp: , Rfl:     Fluticasone-Salmeterol (Advair Diskus) 250-50 MCG/ACT DISKUS, As Needed., Disp: , Rfl:      "levothyroxine (SYNTHROID, LEVOTHROID) 50 MCG tablet, Take 1 tablet by mouth Daily., Disp: , Rfl: 3    metFORMIN (GLUCOPHAGE) 500 MG tablet, Take 1 tablet by mouth 2 (Two) Times a Day With Meals., Disp: , Rfl: 3    montelukast (SINGULAIR) 10 MG tablet, Take 1 tablet by mouth every night at bedtime., Disp: , Rfl:     Omega-3 Fatty Acids (FISH OIL) 1000 MG capsule capsule, Take 2 capsules by mouth Daily., Disp: , Rfl:     OneTouch Verio test strip, 1 each by Other route., Disp: , Rfl:     pioglitazone (ACTOS) 15 MG tablet, Take 1 tablet by mouth Daily., Disp: , Rfl:     PROAIR  (90 Base) MCG/ACT inhaler, , Disp: , Rfl:     Semaglutide, 2 MG/DOSE, (OZEMPIC) 8 MG/3ML solution pen-injector, Inject 2 mg under the skin into the appropriate area as directed Every 7 (Seven) Days., Disp: , Rfl:     valACYclovir (VALTREX) 1000 MG tablet, TAKE 2 TABLETS BY MOUTH 2 TIMES DAILY FOR 1 DAY. (ENOUGH FOR 3 OUTBREAKS), Disp: , Rfl: 0    valsartan-hydrochlorothiazide (DIOVAN-HCT) 320-12.5 MG per tablet, Take 1 tablet by mouth 2 (Two) Times a Day., Disp: , Rfl:     ALLERGIES  Patient has no known allergies.    VITALS  Vitals:    06/10/24 0942   BP: 118/82   BP Location: Left arm   Patient Position: Sitting   Cuff Size: Adult   Weight: 67.5 kg (148 lb 12.8 oz)   Height: 149.9 cm (59\")       PHYSICAL EXAM  Debilities/Disabilities Identified: None  Emotional Behavior: Appropriate  Wt Readings from Last 3 Encounters:   06/10/24 67.5 kg (148 lb 12.8 oz)   02/08/24 68.9 kg (152 lb)   11/03/23 68.9 kg (152 lb)     Ht Readings from Last 1 Encounters:   06/10/24 149.9 cm (59\")     Body mass index is 30.05 kg/m².  Physical Exam  Constitutional:       General: She is not in acute distress.     Appearance: Normal appearance. She is not ill-appearing.   HENT:      Head: Normocephalic and atraumatic.      Mouth/Throat:      Mouth: Mucous membranes are moist.      Pharynx: No posterior oropharyngeal erythema.   Eyes:      General: No scleral " icterus.  Cardiovascular:      Rate and Rhythm: Normal rate and regular rhythm.      Heart sounds: Normal heart sounds.   Pulmonary:      Effort: Pulmonary effort is normal.      Breath sounds: Normal breath sounds.   Abdominal:      General: Abdomen is flat. Bowel sounds are normal. There is no distension.      Palpations: Abdomen is soft. There is no mass.      Tenderness: There is no abdominal tenderness. There is no guarding or rebound. Negative signs include Ayala's sign.      Hernia: No hernia is present.   Musculoskeletal:      Cervical back: Neck supple.   Skin:     General: Skin is warm.      Capillary Refill: Capillary refill takes less than 2 seconds.   Neurological:      General: No focal deficit present.      Mental Status: She is alert and oriented to person, place, and time.   Psychiatric:         Mood and Affect: Mood normal.         Behavior: Behavior normal.         Thought Content: Thought content normal.         Judgment: Judgment normal.         CLINICAL DATA REVIEWED   reviewed previous lab results and integrated with today's visit, reviewed notes from other physicians and/or last GI encounter, reviewed previous endoscopy results and available photos, reviewed surgical pathology results from previous biopsies    ASSESSMENT  Diagnoses and all orders for this visit:    DAS (nonalcoholic steatohepatitis)    Personal history of colonic polyps    Other orders  -     Semaglutide, 2 MG/DOSE, (OZEMPIC) 8 MG/3ML solution pen-injector; Inject 2 mg under the skin into the appropriate area as directed Every 7 (Seven) Days.  -     Fluticasone-Salmeterol (Advair Diskus) 250-50 MCG/ACT DISKUS; As Needed.  -     atorvastatin (LIPITOR) 10 MG tablet; Take 1 tablet by mouth Daily.  -     cholecalciferol (Vitamin D, Cholecalciferol,) 25 MCG (1000 UT) tablet; Take 1 tablet by mouth Every 3 (Three) Days.  -     Calcium Carbonate-Vit D-Min (CALCIUM 1200 PO); Take  by mouth Daily.          PLAN    Continue food  tracking  Will plan for labs in 3-4 months  Reviewed DAS and risk of progression to cirrhosis  11/2026    Return in about 3 months (around 9/10/2024).    I have discussed the above plan with the patient.  They verbalize understanding and are in agreement with the plan.  They have been advised to contact the office for any questions, concerns, or changes related to their health.

## 2024-08-01 ENCOUNTER — OFFICE VISIT (OUTPATIENT)
Dept: ORTHOPEDIC SURGERY | Facility: CLINIC | Age: 69
End: 2024-08-01
Payer: MEDICARE

## 2024-08-01 VITALS — HEIGHT: 59 IN | TEMPERATURE: 98 F | BODY MASS INDEX: 30.2 KG/M2 | WEIGHT: 149.8 LBS

## 2024-08-01 DIAGNOSIS — R52 PAIN: Primary | ICD-10-CM

## 2024-08-01 PROCEDURE — 99214 OFFICE O/P EST MOD 30 MIN: CPT | Performed by: ORTHOPAEDIC SURGERY

## 2024-08-01 PROCEDURE — 1160F RVW MEDS BY RX/DR IN RCRD: CPT | Performed by: ORTHOPAEDIC SURGERY

## 2024-08-01 PROCEDURE — 73562 X-RAY EXAM OF KNEE 3: CPT | Performed by: ORTHOPAEDIC SURGERY

## 2024-08-01 PROCEDURE — 1159F MED LIST DOCD IN RCRD: CPT | Performed by: ORTHOPAEDIC SURGERY

## 2024-08-01 NOTE — PROGRESS NOTES
Patient: Renee Jama  YOB: 1955 69 y.o. female  Medical Record Number: [id number]    Chief Complaints:   Chief Complaint   Patient presents with    Left Knee - Follow-up       History of Present Illness:Renee Jama is a 69 y.o. female who presents for follow-up of  left knee -  large posteripor cyst-  getting worse-  unable to use steps, having instability issues-  ok on level ground. Last injection didn't help for long.    Allergies:   Allergies   Allergen Reactions    Trazodone Nausea And Vomiting       Medications:   Current Outpatient Medications   Medication Sig Dispense Refill    aspirin 81 MG EC tablet Take 1 tablet by mouth Daily.      atorvastatin (LIPITOR) 10 MG tablet Take 1 tablet by mouth Daily.      Calcium Carbonate-Vit D-Min (CALCIUM 1200 PO) Take  by mouth Daily.      cholecalciferol (Vitamin D, Cholecalciferol,) 25 MCG (1000 UT) tablet Take 1 tablet by mouth Every 3 (Three) Days.      empagliflozin (JARDIANCE) 25 MG tablet tablet Take 1 tablet by mouth Daily.      EPINEPHrine (EPIPEN) 0.3 MG/0.3ML solution auto-injector injection USE AS DIRECTED FOR ACUTE ALLERGIC REACTION      Fluticasone-Salmeterol (Advair Diskus) 250-50 MCG/ACT DISKUS As Needed.      levothyroxine (SYNTHROID, LEVOTHROID) 50 MCG tablet Take 1 tablet by mouth Daily.  3    metFORMIN (GLUCOPHAGE) 500 MG tablet Take 1 tablet by mouth 2 (Two) Times a Day With Meals.  3    montelukast (SINGULAIR) 10 MG tablet Take 1 tablet by mouth every night at bedtime.      Omega-3 Fatty Acids (FISH OIL) 1000 MG capsule capsule Take 2 capsules by mouth Daily.      OneTouch Verio test strip 1 each by Other route.      pioglitazone (ACTOS) 15 MG tablet Take 1 tablet by mouth Daily.      PROAIR  (90 Base) MCG/ACT inhaler       Semaglutide, 2 MG/DOSE, (OZEMPIC) 8 MG/3ML solution pen-injector Inject 2 mg under the skin into the appropriate area as directed Every 7 (Seven) Days.      valACYclovir (VALTREX) 1000 MG tablet TAKE 2  "TABLETS BY MOUTH 2 TIMES DAILY FOR 1 DAY. (ENOUGH FOR 3 OUTBREAKS)  0    valsartan-hydrochlorothiazide (DIOVAN-HCT) 320-12.5 MG per tablet Take 1 tablet by mouth 2 (Two) Times a Day.       No current facility-administered medications for this visit.         The following portions of the patient's history were reviewed and updated as appropriate: allergies, current medications, past family history, past medical history, past social history, past surgical history and problem list.    Review of Systems:   Pertinent positives/negative listed in HPI above    Physical Exam:   Vitals:    08/01/24 1514   Temp: 98 °F (36.7 °C)   TempSrc: Temporal   Weight: 67.9 kg (149 lb 12.8 oz)   Height: 149.9 cm (59\")   PainSc: 0-No pain   PainLoc: Knee       General: A and O x 3, ASA, NAD      Knee Exam List: Knee:  left    ALIGNMENT:     Neutral  ,   Patella tracks   midline    GAIT:    Antalgic    SKIN:    No abnormality    RANGE OF MOTION:   Painful flexion    STRENGTH:   5 / 5    LIGAMENTS:    No varus / valgus instability.   Negative  Lachman.    MENISCUS:     Positive  medial   Henry       DISTAL PULSES:    Paplable    DISTAL SENSATION :   Intact    LYMPHATICS:     No   lymphadenopathy    OTHER:          - Positive  effusion      - No crepitance with ROM     Radiology:  Xrays 3views left knee (ap,lateral, sunrise) were ordered and reviewed for evaluation of knee pain demonstrating large posterior medial femoral condyle cyst -  slightly larger vs films from a year ago.      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      Diagnoses and all orders for this visit:    1. Pain (Primary)  -     XR Knee 3 View Left        Tao Segura MD  8/1/2024  "

## 2024-10-10 ENCOUNTER — LAB (OUTPATIENT)
Dept: LAB | Facility: HOSPITAL | Age: 69
End: 2024-10-10
Payer: MEDICARE

## 2024-10-10 ENCOUNTER — OFFICE VISIT (OUTPATIENT)
Dept: GASTROENTEROLOGY | Facility: CLINIC | Age: 69
End: 2024-10-10
Payer: MEDICARE

## 2024-10-10 VITALS
WEIGHT: 150.2 LBS | SYSTOLIC BLOOD PRESSURE: 140 MMHG | DIASTOLIC BLOOD PRESSURE: 80 MMHG | BODY MASS INDEX: 30.28 KG/M2 | HEIGHT: 59 IN

## 2024-10-10 DIAGNOSIS — K75.81 NASH (NONALCOHOLIC STEATOHEPATITIS): Primary | ICD-10-CM

## 2024-10-10 DIAGNOSIS — I10 ESSENTIAL (PRIMARY) HYPERTENSION: ICD-10-CM

## 2024-10-10 LAB
ALBUMIN SERPL-MCNC: 4.4 G/DL (ref 3.5–5.2)
ALBUMIN/GLOB SERPL: 1.8 G/DL
ALP SERPL-CCNC: 74 U/L (ref 39–117)
ALT SERPL W P-5'-P-CCNC: 29 U/L (ref 1–33)
ANION GAP SERPL CALCULATED.3IONS-SCNC: 12.2 MMOL/L (ref 5–15)
AST SERPL-CCNC: 24 U/L (ref 1–32)
BILIRUB SERPL-MCNC: 0.6 MG/DL (ref 0–1.2)
BUN SERPL-MCNC: 23 MG/DL (ref 8–23)
BUN/CREAT SERPL: 29.1 (ref 7–25)
CALCIUM SPEC-SCNC: 10 MG/DL (ref 8.6–10.5)
CHLORIDE SERPL-SCNC: 100 MMOL/L (ref 98–107)
CO2 SERPL-SCNC: 27.8 MMOL/L (ref 22–29)
CREAT SERPL-MCNC: 0.79 MG/DL (ref 0.57–1)
EGFRCR SERPLBLD CKD-EPI 2021: 81.1 ML/MIN/1.73
GLOBULIN UR ELPH-MCNC: 2.4 GM/DL
GLUCOSE SERPL-MCNC: 151 MG/DL (ref 65–99)
POTASSIUM SERPL-SCNC: 4.1 MMOL/L (ref 3.5–5.2)
PROT SERPL-MCNC: 6.8 G/DL (ref 6–8.5)
SODIUM SERPL-SCNC: 140 MMOL/L (ref 136–145)

## 2024-10-10 PROCEDURE — 36415 COLL VENOUS BLD VENIPUNCTURE: CPT

## 2024-10-10 PROCEDURE — 82465 ASSAY BLD/SERUM CHOLESTEROL: CPT

## 2024-10-10 PROCEDURE — 84478 ASSAY OF TRIGLYCERIDES: CPT

## 2024-10-10 PROCEDURE — 82172 ASSAY OF APOLIPOPROTEIN: CPT

## 2024-10-10 PROCEDURE — 82977 ASSAY OF GGT: CPT

## 2024-10-10 PROCEDURE — 83010 ASSAY OF HAPTOGLOBIN QUANT: CPT

## 2024-10-10 PROCEDURE — 80053 COMPREHEN METABOLIC PANEL: CPT

## 2024-10-10 PROCEDURE — 83883 ASSAY NEPHELOMETRY NOT SPEC: CPT

## 2024-10-10 NOTE — PROGRESS NOTES
"    PATIENT INFORMATION  Renee Jama       - 1955    CHIEF COMPLAINT  Chief Complaint   Patient presents with    Non-alcoholic steatohepatitis       HISTORY OF PRESENT ILLNESS    Here today for DAS follow-up     DAS: At LOV lost 4.5 lbs with weight wathcers but was not tracking, so encouraged, AST/ALT 42/58 last month, last A1C 6.0, trig normal. Trulicity for a year+. Resumed actos. Reviewed weight loss. Fibroscan with F0-1. Counseled risks of uncontrolled DAS. TODAY: Weight is unchanged, quit weight [employer]chers, not eating much, knows she needs to increase exercise.     3/4/21 last fibrosure F0-F1, S0-S1, N0.     2023 Last CT with Fatty liver     2014 Colon with 1/2 adenomas  2019 normal colon          REVIEWED PERTINENT RESULTS/ LABS  No results found for: \"CASEREPORT\", \"FINALDX\"  Lab Results   Component Value Date    HGB 16.0 2022    MCV 99.4 2022     2022    ALT 44 (H) 2024    AST 35 2024    HGBA1C 6.1 (H) 2024    TRIG 213 (H) 2022    FERRITIN 134.1 2014    IRON 89 2024    TIBC 526 2024      No results found.    REVIEW OF SYSTEMS  Review of Systems   Constitutional:  Negative for activity change, chills, fever and unexpected weight change.   HENT:  Negative for congestion.    Eyes:  Negative for visual disturbance.   Respiratory:  Negative for shortness of breath.    Cardiovascular:  Negative for chest pain and palpitations.   Gastrointestinal:  Negative for abdominal pain and blood in stool.   Endocrine: Negative for cold intolerance and heat intolerance.   Genitourinary:  Negative for hematuria.   Musculoskeletal:  Negative for gait problem.   Skin:  Negative for color change.   Allergic/Immunologic: Negative for immunocompromised state.   Neurological:  Negative for weakness and light-headedness.   Hematological:  Negative for adenopathy.   Psychiatric/Behavioral:  Negative for sleep disturbance. The patient is not " nervous/anxious.          ACTIVE PROBLEMS  Patient Active Problem List    Diagnosis     Hypothyroidism [E03.9]     DAS (nonalcoholic steatohepatitis) [K75.81]     Type 2 diabetes mellitus without complication, without long-term current use of insulin [E11.9]          PAST MEDICAL HISTORY  Past Medical History:   Diagnosis Date    Colon polyp     Diabetes mellitus     Fatty liver     Hyperlipidemia     Hypertension     Liver disease          SURGICAL HISTORY  Past Surgical History:   Procedure Laterality Date    COLONOSCOPY           FAMILY HISTORY  Family History   Problem Relation Age of Onset    Liver disease Father     Colon cancer Neg Hx     Colon polyps Neg Hx          SOCIAL HISTORY  Social History     Occupational History    Not on file   Tobacco Use    Smoking status: Former     Current packs/day: 1.00     Average packs/day: 1 pack/day for 5.0 years (5.0 ttl pk-yrs)     Types: Cigarettes     Passive exposure: Past    Smokeless tobacco: Never    Tobacco comments:     From age 16-age 21   Vaping Use    Vaping status: Never Used   Substance and Sexual Activity    Alcohol use: Yes     Comment: 1-2 glasses of wine or coctail a week    Drug use: Never    Sexual activity: Yes     Partners: Male     Birth control/protection: Post-menopausal         CURRENT MEDICATIONS    Current Outpatient Medications:     aspirin 81 MG EC tablet, Take 1 tablet by mouth Daily., Disp: , Rfl:     atorvastatin (LIPITOR) 10 MG tablet, Take 1 tablet by mouth Daily., Disp: , Rfl:     Calcium Carbonate-Vit D-Min (CALCIUM 1200 PO), Take  by mouth Daily., Disp: , Rfl:     cholecalciferol (Vitamin D, Cholecalciferol,) 25 MCG (1000 UT) tablet, Take 1 tablet by mouth Every 3 (Three) Days., Disp: , Rfl:     empagliflozin (JARDIANCE) 25 MG tablet tablet, Take 1 tablet by mouth Daily., Disp: , Rfl:     EPINEPHrine (EPIPEN) 0.3 MG/0.3ML solution auto-injector injection, USE AS DIRECTED FOR ACUTE ALLERGIC REACTION, Disp: , Rfl:      "Fluticasone-Salmeterol (Advair Diskus) 250-50 MCG/ACT DISKUS, As Needed., Disp: , Rfl:     levothyroxine (SYNTHROID, LEVOTHROID) 50 MCG tablet, Take 1 tablet by mouth Daily., Disp: , Rfl: 3    metFORMIN (GLUCOPHAGE) 500 MG tablet, Take 1 tablet by mouth 2 (Two) Times a Day With Meals., Disp: , Rfl: 3    montelukast (SINGULAIR) 10 MG tablet, Take 1 tablet by mouth every night at bedtime., Disp: , Rfl:     OneTouch Verio test strip, 1 each by Other route., Disp: , Rfl:     pioglitazone (ACTOS) 15 MG tablet, Take 1 tablet by mouth Daily., Disp: , Rfl:     PROAIR  (90 Base) MCG/ACT inhaler, , Disp: , Rfl:     Semaglutide, 2 MG/DOSE, (OZEMPIC) 8 MG/3ML solution pen-injector, Inject 2 mg under the skin into the appropriate area as directed Every 7 (Seven) Days., Disp: , Rfl:     valACYclovir (VALTREX) 1000 MG tablet, TAKE 2 TABLETS BY MOUTH 2 TIMES DAILY FOR 1 DAY. (ENOUGH FOR 3 OUTBREAKS), Disp: , Rfl: 0    valsartan-hydrochlorothiazide (DIOVAN-HCT) 320-12.5 MG per tablet, Take 1 tablet by mouth 2 (Two) Times a Day., Disp: , Rfl:     Omega-3 Fatty Acids (FISH OIL) 1000 MG capsule capsule, Take 2 capsules by mouth Daily. (Patient not taking: Reported on 10/10/2024), Disp: , Rfl:     ALLERGIES  Trazodone    VITALS  Vitals:    10/10/24 1007   BP: 140/80   BP Location: Left arm   Patient Position: Sitting   Cuff Size: Adult   Weight: 68.1 kg (150 lb 3.2 oz)   Height: 149.9 cm (59\")       PHYSICAL EXAM  Debilities/Disabilities Identified: None  Emotional Behavior: Appropriate  Wt Readings from Last 3 Encounters:   10/10/24 68.1 kg (150 lb 3.2 oz)   08/01/24 67.9 kg (149 lb 12.8 oz)   06/10/24 67.5 kg (148 lb 12.8 oz)     Ht Readings from Last 1 Encounters:   10/10/24 149.9 cm (59\")     Body mass index is 30.34 kg/m².  Physical Exam  Constitutional:       General: She is not in acute distress.     Appearance: Normal appearance. She is not ill-appearing.   HENT:      Head: Normocephalic and atraumatic.      Mouth/Throat: "      Mouth: Mucous membranes are moist.      Pharynx: No posterior oropharyngeal erythema.   Eyes:      General: No scleral icterus.  Cardiovascular:      Rate and Rhythm: Normal rate and regular rhythm.      Heart sounds: Normal heart sounds.   Pulmonary:      Effort: Pulmonary effort is normal.      Breath sounds: Normal breath sounds.   Abdominal:      General: Abdomen is flat. Bowel sounds are normal. There is no distension.      Palpations: Abdomen is soft. There is no mass.      Tenderness: There is no abdominal tenderness. There is no guarding or rebound. Negative signs include Ayala's sign.      Hernia: No hernia is present.   Musculoskeletal:      Cervical back: Neck supple.   Skin:     General: Skin is warm.      Capillary Refill: Capillary refill takes less than 2 seconds.   Neurological:      General: No focal deficit present.      Mental Status: She is alert and oriented to person, place, and time.   Psychiatric:         Mood and Affect: Mood normal.         Behavior: Behavior normal.         Thought Content: Thought content normal.         Judgment: Judgment normal.         CLINICAL DATA REVIEWED   reviewed previous lab results and integrated with today's visit, reviewed notes from other physicians and/or last GI encounter, reviewed previous endoscopy results and available photos, reviewed surgical pathology results from previous biopsies    ASSESSMENT  Diagnoses and all orders for this visit:    DAS (nonalcoholic steatohepatitis)  -     DAS Fibrosure  -     Comprehensive Metabolic Panel    Essential (primary) hypertension  -     DAS Fibrosure          PLAN    DAS: Extensively reviewed risks of uncontrolled DAS, reviewed diet and lifestyle, will check fibrosure today and had brief convo of rezdiffra and will discuss again if candidate, patient understanding  Next colon due 2026    Return in about 4 months (around 2/10/2025).    I have discussed the above plan with the patient.  They verbalize  understanding and are in agreement with the plan.  They have been advised to contact the office for any questions, concerns, or changes related to their health.

## 2024-10-13 LAB
A2 MACROGLOB SERPL-MCNC: 270 MG/DL (ref 110–276)
ALT SERPL W P-5'-P-CCNC: 32 IU/L (ref 0–40)
APO A-I SERPL-MCNC: 164 MG/DL (ref 116–209)
AST SERPL W P-5'-P-CCNC: 29 IU/L (ref 0–40)
BILIRUB SERPL-MCNC: 0.5 MG/DL (ref 0–1.2)
CHOLEST SERPL-MCNC: 159 MG/DL (ref 100–199)
FIBROSIS SCORING:: ABNORMAL
FIBROSIS STAGE SERPL QL: ABNORMAL
GGT SERPL-CCNC: 18 IU/L (ref 0–60)
GLUCOSE SERPL-MCNC: 151 MG/DL (ref 70–99)
HAPTOGLOB SERPL-MCNC: 100 MG/DL (ref 37–355)
LABORATORY COMMENT REPORT: ABNORMAL
LIVER FIBR SCORE SERPL CALC.FIBROSURE: 0.33 (ref 0–0.21)
LIVER STEATOSIS GRADE SERPL QL: ABNORMAL
LIVER STEATOSIS SCORE SERPL: 0.66 (ref 0–0.4)
NASH GRADE SERPL QL: ABNORMAL
NASH INTERPRETATION SERPL-IMP: ABNORMAL
NASH SCORE SERPL: 0.73 (ref 0–0.25)
NASH SCORING: ABNORMAL
STEATOSIS SCORING: ABNORMAL
TEST PERFORMANCE INFO SPEC: ABNORMAL
TEST PERFORMANCE INFO SPEC: ABNORMAL
TRIGL SERPL-MCNC: 255 MG/DL (ref 0–149)

## 2024-11-05 ENCOUNTER — OFFICE VISIT (OUTPATIENT)
Dept: ORTHOPEDIC SURGERY | Facility: CLINIC | Age: 69
End: 2024-11-05
Payer: MEDICARE

## 2024-11-05 VITALS — WEIGHT: 147.2 LBS | HEIGHT: 59 IN | TEMPERATURE: 97.3 F | BODY MASS INDEX: 29.68 KG/M2

## 2024-11-05 DIAGNOSIS — M70.62 TROCHANTERIC BURSITIS OF LEFT HIP: ICD-10-CM

## 2024-11-05 DIAGNOSIS — R52 PAIN: Primary | ICD-10-CM

## 2024-11-05 RX ORDER — METHYLPREDNISOLONE ACETATE 80 MG/ML
80 INJECTION, SUSPENSION INTRA-ARTICULAR; INTRALESIONAL; INTRAMUSCULAR; SOFT TISSUE
Status: COMPLETED | OUTPATIENT
Start: 2024-11-05 | End: 2024-11-05

## 2024-11-05 RX ADMIN — METHYLPREDNISOLONE ACETATE 80 MG: 80 INJECTION, SUSPENSION INTRA-ARTICULAR; INTRALESIONAL; INTRAMUSCULAR; SOFT TISSUE at 13:45

## 2024-11-05 NOTE — PROGRESS NOTES
Patient: Renee Jama  YOB: 1955 69 y.o. female  Medical Record Number: [id number]    Chief Complaints:   Chief Complaint   Patient presents with    Left Hip - Initial Evaluation       History of Present Illness:Renee Jama is a 69 y.o. female who presents with complaints of worsening in left hip pain.  The patient was seen previously for left hip bursitis she also has a history of sciatica she reports her pain is worsened over the last several weeks.  She denies any injury.  She reports is definitely worse at night when she lies on her side    Allergies:   Allergies   Allergen Reactions    Trazodone Nausea And Vomiting       Medications:   Current Outpatient Medications   Medication Sig Dispense Refill    aspirin 81 MG EC tablet Take 1 tablet by mouth Daily.      atorvastatin (LIPITOR) 10 MG tablet Take 1 tablet by mouth Daily.      Calcium Carbonate-Vit D-Min (CALCIUM 1200 PO) Take  by mouth Daily.      cholecalciferol (Vitamin D, Cholecalciferol,) 25 MCG (1000 UT) tablet Take 1 tablet by mouth Every 3 (Three) Days.      empagliflozin (JARDIANCE) 25 MG tablet tablet Take 1 tablet by mouth Daily.      EPINEPHrine (EPIPEN) 0.3 MG/0.3ML solution auto-injector injection USE AS DIRECTED FOR ACUTE ALLERGIC REACTION      Fluticasone-Salmeterol (Advair Diskus) 250-50 MCG/ACT DISKUS As Needed.      levothyroxine (SYNTHROID, LEVOTHROID) 50 MCG tablet Take 1 tablet by mouth Daily.  3    metFORMIN (GLUCOPHAGE) 500 MG tablet Take 1 tablet by mouth 2 (Two) Times a Day With Meals.  3    montelukast (SINGULAIR) 10 MG tablet Take 1 tablet by mouth every night at bedtime.      OneTouch Verio test strip 1 each by Other route.      pioglitazone (ACTOS) 15 MG tablet Take 1 tablet by mouth Daily.      PROAIR  (90 Base) MCG/ACT inhaler       Semaglutide, 2 MG/DOSE, (OZEMPIC) 8 MG/3ML solution pen-injector Inject 2 mg under the skin into the appropriate area as directed Every 7 (Seven) Days.      valACYclovir  "(VALTREX) 1000 MG tablet TAKE 2 TABLETS BY MOUTH 2 TIMES DAILY FOR 1 DAY. (ENOUGH FOR 3 OUTBREAKS)  0    valsartan-hydrochlorothiazide (DIOVAN-HCT) 320-12.5 MG per tablet Take 1 tablet by mouth 2 (Two) Times a Day.      Omega-3 Fatty Acids (FISH OIL) 1000 MG capsule capsule Take 2 capsules by mouth Daily. (Patient not taking: Reported on 11/5/2024)       No current facility-administered medications for this visit.         The following portions of the patient's history were reviewed and updated as appropriate: allergies, current medications, past family history, past medical history, past social history, past surgical history and problem list.    Review of Systems:   14 point review of systems was performed. All systems negative except pertinent positives/negatives listed in HPI above    Physical Exam:   Vitals:    11/05/24 1309   Temp: 97.3 °F (36.3 °C)   TempSrc: Temporal   Weight: 66.8 kg (147 lb 3.2 oz)   Height: 149.9 cm (59\")   PainSc:   4   PainLoc: Hip       General: A and O x 3, ASA, NAD   Skin clear no unusual lesions noted  Left hip patient is tender palpation over left hip greater trochanteric bursa she has normal internal ex rotation with a negative Stinchfield negative logroll calf soft and nontender       Radiology:  Xrays 2 views left hip were ordered and reviewed today secondary to pain and show mild arthritic changes.  Compared to views are not available    Assessment/Plan: Left hip greater trochanteric bursitis with increased pain    Patient and I discussed options, we will proceed with a left hip bursa injection, physical therapy, prescription given for lidocaine ketoprofen cream to be used topically and I will see her back as needed    Large Joint Arthrocentesis: L greater trochanteric bursa  Date/Time: 11/5/2024 1:45 PM  Consent given by: patient  Site marked: site marked  Timeout: Immediately prior to procedure a time out was called to verify the correct patient, procedure, equipment, support " staff and site/side marked as required   Supporting Documentation  Indications: pain   Procedure Details  Location: hip - L greater trochanteric bursa  Needle size: 22 G  Approach: lateral  Medications administered: 80 mg methylPREDNISolone acetate 80 MG/ML; 2 mL lidocaine (cardiac)             JEWEL Kerr  11/5/2024  Answers submitted by the patient for this visit:  Other (Submitted on 10/30/2024)  Please describe your symptoms.: Left leg pain, possibly hip  Have you had these symptoms before?: Yes  How long have you been having these symptoms?: Greater than 2 weeks  Primary Reason for Visit (Submitted on 10/30/2024)  What is the primary reason for your visit?: Problem Not Listed

## 2024-11-15 ENCOUNTER — TELEPHONE (OUTPATIENT)
Dept: ORTHOPEDIC SURGERY | Facility: CLINIC | Age: 69
End: 2024-11-15
Payer: MEDICARE

## 2024-11-15 DIAGNOSIS — M70.62 TROCHANTERIC BURSITIS OF LEFT HIP: Primary | ICD-10-CM

## 2024-11-20 ENCOUNTER — TELEPHONE (OUTPATIENT)
Dept: ORTHOPEDIC SURGERY | Facility: CLINIC | Age: 69
End: 2024-11-20
Payer: MEDICARE

## 2024-11-20 NOTE — TELEPHONE ENCOUNTER
PT will include the leg if/do to the pain is referring down the leg. They will do a eval on pt's first visit.

## 2024-11-20 NOTE — TELEPHONE ENCOUNTER
I SEE REFERRAL IN FOR PT FOR LT HIP, PATIENT IS SAYING THE REFERRAL NEEDS TO BE LT HIP/ LT LEG, IF REFERRAL COULD BE UPDATED TO GET SCHEDULED

## 2024-12-09 ENCOUNTER — TREATMENT (OUTPATIENT)
Dept: PHYSICAL THERAPY | Facility: CLINIC | Age: 69
End: 2024-12-09
Payer: MEDICARE

## 2024-12-09 DIAGNOSIS — M70.62 TROCHANTERIC BURSITIS OF LEFT HIP: ICD-10-CM

## 2024-12-09 DIAGNOSIS — M54.16 RADICULOPATHY, LUMBAR REGION: ICD-10-CM

## 2024-12-09 DIAGNOSIS — M25.652 DECREASED RANGE OF LEFT HIP MOVEMENT: ICD-10-CM

## 2024-12-09 DIAGNOSIS — M25.552 LEFT HIP PAIN: Primary | ICD-10-CM

## 2024-12-09 PROCEDURE — 97110 THERAPEUTIC EXERCISES: CPT

## 2024-12-09 PROCEDURE — 97530 THERAPEUTIC ACTIVITIES: CPT

## 2024-12-09 PROCEDURE — 97161 PT EVAL LOW COMPLEX 20 MIN: CPT

## 2024-12-09 NOTE — PROGRESS NOTES
Physical Therapy Initial Evaluation and Plan of Care  Clinic Location [address] [facility], [place] [id number]    Patient: Renee Jama   : 1955  Diagnosis/ICD-10 Code:  Left hip pain [M25.552]  Referring practitioner: JEWEL Salgado  Date of Initial Visit: 2024  Today's Date: 2024  Patient seen for 1 session         Visit Diagnoses:    ICD-10-CM ICD-9-CM   1. Left hip pain  M25.552 719.45   2. Trochanteric bursitis of left hip  M70.62 726.5   3. Radiculopathy, lumbar region  M54.16 724.4   4. Decreased range of left hip movement  M25.652 719.55         Subjective Questionnaire: LEFS:       Subjective Evaluation    History of Present Illness  Mechanism of injury: Chronic history of L hip and knee problems. Hx of injections in L hip, most recently on 24 that has not improved symptoms. Reports Sciatica on L side. Has issues lifting heavy things, going up stairs > than going down, laying on L side, and prolonged sitting all elicit hip pain. Pain is increasing quickly she reports. L hip pain radiates down L thigh, sometimes past the knee. Reports her balance is not good.    PMHx of liver issues so avoids advil.      Patient Occupation: Retired, Quality of life: good    Pain  Current pain ratin  Quality: dull ache and burning  Relieving factors: rest  Aggravating factors: stairs, ambulation, prolonged positioning, squatting and movement  Progression: worsening    Social Support  Lives in: multiple-level home    Treatments  Previous treatment: injection treatment  Patient Goals  Patient goals for therapy: decreased pain, independence with ADLs/IADLs and return to sport/leisure activities         IMAGING  24  Radiology: Xrays 2 views left hip were ordered and reviewed today secondary to pain and show mild arthritic changes. Compared to views are not available     24  Radiology:  Xrays 3views left knee (ap,lateral, sunrise) were ordered and reviewed for  evaluation of knee pain demonstrating large posterior medial femoral condyle cyst -  slightly larger vs films from a year ago.     10/23/23  MRI LUMBAR SPINE  IMPRESSION:   1. Disc bulging with mild-to-moderate central canal stenosis at L2-L3   and encroachment on the left subarticular recess.       Objective          Static Posture     Comments  (+) SLS test for bursitis on L, symptoms exacerbated with SLS.    Tenderness     Lumbar Spine  Tenderness in the spinous process.     Left Hip   Tenderness in the greater trochanter.     Additional Tenderness Details  L2-L3 SP TTP.    Active Range of Motion   Left Hip   Flexion: WFL  Extension: WFL  Abduction: WFL  Adduction: WFL    Right Hip   Normal active range of motion    Additional Active Range of Motion Details  Passive ER and IR of L hip limited compared to R.    Strength/Myotome Testing     Left Hip   Planes of Motion   Flexion: 4-  Extension: 4-  Abduction: 4-    Right Hip   Planes of Motion   Flexion: 4-  Extension: 4  Abduction: 4+    Left Knee   Flexion: WFL  Extension: WFL    Right Knee   Flexion: WFL  Extension: WFL    Tests     Lumbar     Left   Positive passive SLR.     Left Hip   Positive TRACE.     Left Hip Flexibility Comments:   Poor extensibility of L hamstring, also positive for neural tension.    Right Hip Flexibility Comments:   WNL.    Ambulation     Observational Gait   Gait: within functional limits   Walking speed and stride length within functional limits.     Functional Assessment     Comments  SLS <10 seconds bilaterally.          Assessment & Plan       Assessment  Impairments: abnormal or restricted ROM, activity intolerance, impaired balance, impaired physical strength, lacks appropriate home exercise program and pain with function   Functional limitations: sleeping, walking, uncomfortable because of pain, moving in bed, sitting and standing   Assessment details: Pt is a 70 y/o female referred to PT for L trochanteric bursitis. Pt presents  with L hip pain, balance deficits, strength deficits, L hip ROM deficits, and positive special testing. S/S consistent with referring diagnosis of L trochanteric bursitis, but pts S/S also consistent with lumbar radiculopathy as well as arthritis of L hip. She will benefit from skilled PT services in order to address listed impairments and increase tolerance to normal daily activities including ADLs and recreational activities.      Goals  Plan Goals: Short Term Goals: 4 weeks. Patient will:  1. Be independent with initial HEP  2. Be instructed in posture and body mechanics  3. Demonstrate TrA contraction during exercises in clinic without need for cueing.  4. Improve SLS > 10s bilaterally to display improved LE stability.    Long Term Goals: 8 weeks. Patient will:  1. Demonstrate improved Right lower extremity/core MMT of >/= 4+/5 to allow for return to recreational/daily activities without increased pain.  2. Demonstrate normal lower extremity flexibility to allow for walking/ADLs/performance of household tasks without pain.  3. Have no soft tissue restriction in involved musculature.  4. Report pain of </= 1/10 with all daily activities.  6. Perceived disability </= 15% as measured on LEFS  7. Be independent with long term HEP    Plan  Therapy options: will be seen for skilled therapy services  Planned modality interventions: cryotherapy, electrical stimulation/Russian stimulation, thermotherapy (hydrocollator packs), ultrasound and dry needling  Planned therapy interventions: abdominal trunk stabilization, ADL retraining, balance/weight-bearing training, body mechanics training, flexibility, functional ROM exercises, gait training, home exercise program, joint mobilization, manual therapy, neuromuscular re-education, soft tissue mobilization, spinal/joint mobilization, strengthening, stretching and therapeutic activities  Frequency: 2x week  Duration in weeks: 8  Treatment plan discussed with:  patient            Timed:         Manual Therapy:         mins  72451;     Therapeutic Exercise:    13     mins  55096;     Neuromuscular Prerna:        mins  97107;    Therapeutic Activity:     10     mins  09875;     Gait Training:           mins  69397;     Ultrasound:          mins  76182;    Ionto                                   mins   56124  Self Care                            mins   34511  Canalith Repos         mins 01988      Un-Timed:  Electrical Stimulation:         mins  37448 ( );  Dry Needling          mins self-pay  Traction          mins 80993  Low Eval     17     Mins  53635  Mod Eval          Mins  29289  High Eval                            Mins  32893        Timed Treatment:   23   mins   Total Treatment:     40   mins          PT: Cuauhtemoc Brock PT     [place] License #: [id number]  Electronically signed by Cuauhtemoc Brock PT, 12/09/24, 9:50 AM EST    Certification Period: 12/9/2024 thru 3/8/2025  I certify that the therapy services are furnished while this patient is under my care.  The services outlined above are required by this patient, and will be reviewed every 90 days.         Physician Signature:__________________________________________________    PHYSICIAN: Char Guaman APRN  NPI: [id number]                                      DATE:      Please sign and return via fax to [email or web address] . Thank you, [facility].

## 2024-12-09 NOTE — PATIENT INSTRUCTIONS
Access Code: [email or web address]  URL: [email or web address]  Date: 12/09/2024  Prepared by: Cuauhtemoc Brock    Exercises  - Supine Piriformis Stretch with Foot on Ground  - 2 x daily - 7 x weekly - 1 sets - 10 reps - 10s hold  - Supine Figure 4 Piriformis Stretch  - 2 x daily - 7 x weekly - 1 sets - 10 reps - 10s hold  - Supine 90/90 Sciatic Nerve Glide with Knee Flexion/Extension  - 2 x daily - 7 x weekly - 1 sets - 30 reps  - Sidelying Hip Abduction  - 2 x daily - 7 x weekly - 3 sets - 10 reps

## 2024-12-16 ENCOUNTER — TREATMENT (OUTPATIENT)
Dept: PHYSICAL THERAPY | Facility: CLINIC | Age: 69
End: 2024-12-16
Payer: MEDICARE

## 2024-12-16 DIAGNOSIS — M25.552 LEFT HIP PAIN: Primary | ICD-10-CM

## 2024-12-16 DIAGNOSIS — M25.652 DECREASED RANGE OF LEFT HIP MOVEMENT: ICD-10-CM

## 2024-12-16 DIAGNOSIS — M70.62 TROCHANTERIC BURSITIS OF LEFT HIP: ICD-10-CM

## 2024-12-16 DIAGNOSIS — M54.16 RADICULOPATHY, LUMBAR REGION: ICD-10-CM

## 2024-12-16 PROCEDURE — 97530 THERAPEUTIC ACTIVITIES: CPT

## 2024-12-16 PROCEDURE — 97110 THERAPEUTIC EXERCISES: CPT

## 2024-12-16 NOTE — PROGRESS NOTES
Physical Therapy Daily Treatment Note  [facility] [place]   [address], [address]  [place] [address]  P: [phone]  F: [phone]    Patient: Renee Jama   : 1955  Referring practitioner: JEWEL Salgado  Date of Initial Visit: Type: THERAPY  Noted: 2024  Today's Date: 2024  Patient seen for 2 sessions       Visit Diagnoses:    ICD-10-CM ICD-9-CM   1. Left hip pain  M25.552 719.45   2. Trochanteric bursitis of left hip  M70.62 726.5   3. Radiculopathy, lumbar region  M54.16 724.4   4. Decreased range of left hip movement  M25.652 719.55         Renee Jama reports: no new changes, has been sick some and has not been able to partake in her HEP.    Subjective     Objective   See Exercise, Manual, and Modality Logs for complete treatment.       Assessment:  Pt tolerated today's treatment session well with no adverse responses during treatment session. Pt continues to display limitations including LE strength deficits impacting her tolerance to ADLs. HEP updated today to include sidesteps and STS. Pt will benefit from continued skilled PT services.        Plan:  Progress per POC.         Timed:         Manual Therapy:         mins  05494;     Therapeutic Exercise:    15     mins  35851;     Neuromuscular Prerna:    5    mins  07127;    Therapeutic Activity:     13     mins  22617;     Gait Training:           mins  32482;     Ultrasound:          mins  72232;    Ionto                                   mins  87740  Self Care                            mins  92102  Traction          mins 73621      Un-Timed:  Canalith Repos         mins 55059  Electrical Stimulation:         mins  89979 ( );  Dry Needling          mins self-pay  Traction          mins 59965        Timed Treatment:   33   mins   Total Treatment:     33   mins    Cuauhtemoc Brock PT  [place] License #: [id number]    Physical Therapist

## 2024-12-23 ENCOUNTER — TREATMENT (OUTPATIENT)
Dept: PHYSICAL THERAPY | Facility: CLINIC | Age: 69
End: 2024-12-23
Payer: MEDICARE

## 2024-12-23 DIAGNOSIS — M25.652 DECREASED RANGE OF LEFT HIP MOVEMENT: ICD-10-CM

## 2024-12-23 DIAGNOSIS — M25.552 LEFT HIP PAIN: Primary | ICD-10-CM

## 2024-12-23 DIAGNOSIS — M70.62 TROCHANTERIC BURSITIS OF LEFT HIP: ICD-10-CM

## 2024-12-23 DIAGNOSIS — M54.16 RADICULOPATHY, LUMBAR REGION: ICD-10-CM

## 2024-12-23 PROCEDURE — 97112 NEUROMUSCULAR REEDUCATION: CPT

## 2024-12-23 PROCEDURE — 97110 THERAPEUTIC EXERCISES: CPT

## 2024-12-23 NOTE — PROGRESS NOTES
Physical Therapy Daily Treatment Note  [facility] [place]   [address], [address]  [place] [address]  P: [phone]  F: [phone]    Patient: Renee Jama   : 1955  Referring practitioner: JEWEL Salgado  Date of Initial Visit: Type: THERAPY  Noted: 2024  Today's Date: 2024  Patient seen for 3 sessions       Visit Diagnoses:    ICD-10-CM ICD-9-CM   1. Left hip pain  M25.552 719.45   2. Trochanteric bursitis of left hip  M70.62 726.5   3. Radiculopathy, lumbar region  M54.16 724.4   4. Decreased range of left hip movement  M25.652 719.55         Renee Jama reports: she is doing well. Has been massaging her hip which she believes is helping. Continues to have pain at night when laying on her L side, which irritates her hip.    Subjective     Objective   See Exercise, Manual, and Modality Logs for complete treatment.       Assessment:  Pt tolerated today's treatment session well with no adverse responses during treatment session. Pt continues to display limitations including S/S consistent with bursitis impacting her tolerance to ADLs. 3 way hip exercise added to HEP today.. Pt will benefit from continued skilled PT services.       Plan:  Progress per POC.         Timed:         Manual Therapy:         mins  12777;     Therapeutic Exercise:    10     mins  93498;     Neuromuscular Prerna:    13    mins  13473;    Therapeutic Activity:     5     mins  39837;     Gait Training:           mins  41124;     Ultrasound:          mins  33639;    Ionto                                   mins  11326  Self Care                            mins  16231  Traction          mins 97026      Un-Timed:  Canalith Repos         mins 95220  Electrical Stimulation:         mins  18402 (MC );  Dry Needling          mins self-pay  Traction          mins 75221        Timed Treatment:   28   mins   Total Treatment:     32   mins    Cuauhtemoc Brock PT  [place] License #:  [id number]    Physical Therapist

## 2025-01-02 ENCOUNTER — TELEPHONE (OUTPATIENT)
Dept: ORTHOPEDIC SURGERY | Facility: CLINIC | Age: 70
End: 2025-01-02
Payer: MEDICARE

## 2025-01-02 ENCOUNTER — TELEPHONE (OUTPATIENT)
Dept: PHYSICAL THERAPY | Facility: CLINIC | Age: 70
End: 2025-01-02
Payer: MEDICARE

## 2025-01-02 DIAGNOSIS — M70.62 TROCHANTERIC BURSITIS OF LEFT HIP: Primary | ICD-10-CM

## 2025-01-02 NOTE — TELEPHONE ENCOUNTER
Caller: Renee Jama    Relationship: Self         What was the call regarding: TRANSPORTATION ISSUES, GOING CLOSER TO HOUSE , RESULTS ON [facility] [address] [id number] [phone] SEEING ABHISHEK

## 2025-02-14 ENCOUNTER — LAB (OUTPATIENT)
Dept: LAB | Facility: HOSPITAL | Age: 70
End: 2025-02-14
Payer: MEDICARE

## 2025-02-14 ENCOUNTER — OFFICE VISIT (OUTPATIENT)
Dept: GASTROENTEROLOGY | Facility: CLINIC | Age: 70
End: 2025-02-14
Payer: MEDICARE

## 2025-02-14 VITALS
BODY MASS INDEX: 29.11 KG/M2 | WEIGHT: 144.4 LBS | DIASTOLIC BLOOD PRESSURE: 70 MMHG | SYSTOLIC BLOOD PRESSURE: 120 MMHG | HEIGHT: 59 IN

## 2025-02-14 DIAGNOSIS — K75.81 NASH (NONALCOHOLIC STEATOHEPATITIS): Primary | ICD-10-CM

## 2025-02-14 DIAGNOSIS — Z86.0101 PERSONAL HISTORY OF ADENOMATOUS AND SERRATED COLON POLYPS: ICD-10-CM

## 2025-02-14 LAB
ALBUMIN SERPL-MCNC: 4.1 G/DL (ref 3.5–5.2)
ALBUMIN/GLOB SERPL: 1.3 G/DL
ALP SERPL-CCNC: 70 U/L (ref 39–117)
ALT SERPL W P-5'-P-CCNC: 27 U/L (ref 1–33)
ANION GAP SERPL CALCULATED.3IONS-SCNC: 9.1 MMOL/L (ref 5–15)
AST SERPL-CCNC: 24 U/L (ref 1–32)
BILIRUB SERPL-MCNC: 0.5 MG/DL (ref 0–1.2)
BUN SERPL-MCNC: 19 MG/DL (ref 8–23)
BUN/CREAT SERPL: 21.1 (ref 7–25)
CALCIUM SPEC-SCNC: 10.7 MG/DL (ref 8.6–10.5)
CHLORIDE SERPL-SCNC: 100 MMOL/L (ref 98–107)
CO2 SERPL-SCNC: 29.9 MMOL/L (ref 22–29)
CREAT SERPL-MCNC: 0.9 MG/DL (ref 0.57–1)
EGFRCR SERPLBLD CKD-EPI 2021: 69.3 ML/MIN/1.73
GLOBULIN UR ELPH-MCNC: 3.1 GM/DL
GLUCOSE SERPL-MCNC: 125 MG/DL (ref 65–99)
POTASSIUM SERPL-SCNC: 4.9 MMOL/L (ref 3.5–5.2)
PROT SERPL-MCNC: 7.2 G/DL (ref 6–8.5)
SODIUM SERPL-SCNC: 139 MMOL/L (ref 136–145)

## 2025-02-14 PROCEDURE — 80053 COMPREHEN METABOLIC PANEL: CPT

## 2025-02-14 PROCEDURE — 36415 COLL VENOUS BLD VENIPUNCTURE: CPT

## 2025-02-14 RX ORDER — EMPAGLIFLOZIN, METFORMIN HYDROCHLORIDE 25; 1000 MG/1; MG/1
TABLET, EXTENDED RELEASE ORAL DAILY
COMMUNITY
Start: 2024-11-21

## 2025-02-14 NOTE — PROGRESS NOTES
"    PATIENT INFORMATION  Renee Jama       - 1955    CHIEF COMPLAINT  Chief Complaint   Patient presents with    Non-alcoholic steatohepatitis    Essential hypertension       HISTORY OF PRESENT ILLNESS  Here today for DAS follow-up     DAS: At LOV lost 4.5 lbs with weight wathcers but was not tracking, so encouraged, AST/ALT 42/58 last month, last A1C 6.0, trig normal. Trulicity for a year+. Resumed actos. Reviewed weight loss. Fibroscan with F0-1. Counseled risks of uncontrolled DAS. Per LOV: Weight is unchanged, quit weight watchers, not eating much, knows she needs to increase exercise. TODAY: Is exercising now when she can and has lost some weight and she is upset with fibrosure results, provided encouragement to continue monitoring risk factors. Will hold off on rezdiffra as long as labs remain normal      3/4/21 last fibrosure F0-F1, S0-S1, N0.   Fibrosure N2, S2-3, F1-2    Doing well on ozempic and not having any GI side effects.     2023 Last CT with Fatty liver     2014 Colon with 1/2 adenomas  2019 normal colon          REVIEWED PERTINENT RESULTS/ LABS  No results found for: \"CASEREPORT\", \"FINALDX\"  Lab Results   Component Value Date    HGB 16.0 2022    MCV 99.4 2022     2022    ALT 29 10/10/2024    AST 24 10/10/2024    HGBA1C 6.1 (H) 2024    TRIG 255 (H) 10/10/2024    FERRITIN 134.1 2014    IRON 89 2024    TIBC 526 2024      No results found.    REVIEW OF SYSTEMS  Review of Systems      ACTIVE PROBLEMS  Patient Active Problem List    Diagnosis     Hypothyroidism [E03.9]     DAS (nonalcoholic steatohepatitis) [K75.81]     Type 2 diabetes mellitus without complication, without long-term current use of insulin [E11.9]          PAST MEDICAL HISTORY  Past Medical History:   Diagnosis Date    Bursitis of hip     Colon polyp     CTS (carpal tunnel syndrome)     Diabetes mellitus     Fatty liver     Frozen shoulder     " Hyperlipidemia     Hypertension     Liver disease     Wrist sprain          SURGICAL HISTORY  Past Surgical History:   Procedure Laterality Date    COLONOSCOPY      FOOT SURGERY      HAND SURGERY           FAMILY HISTORY  Family History   Problem Relation Age of Onset    Liver disease Father     Diabetes Mother         Diabetes    Diabetes Maternal Aunt         Diabetes    Colon cancer Neg Hx     Colon polyps Neg Hx          SOCIAL HISTORY  Social History     Occupational History    Not on file   Tobacco Use    Smoking status: Former     Current packs/day: 1.00     Average packs/day: 1 pack/day for 5.0 years (5.0 ttl pk-yrs)     Types: Cigarettes     Passive exposure: Past    Smokeless tobacco: Never    Tobacco comments:     From age 16-age 21   Vaping Use    Vaping status: Never Used   Substance and Sexual Activity    Alcohol use: Not Currently     Comment: 1-2 glasses  of wine per month    Drug use: Never    Sexual activity: Yes     Partners: Male     Birth control/protection: Post-menopausal         CURRENT MEDICATIONS    Current Outpatient Medications:     aspirin 81 MG EC tablet, Take 1 tablet by mouth Daily., Disp: , Rfl:     atorvastatin (LIPITOR) 10 MG tablet, Take 1 tablet by mouth Daily., Disp: , Rfl:     Calcium Carbonate-Vit D-Min (CALCIUM 1200 PO), Take  by mouth Daily., Disp: , Rfl:     cholecalciferol (Vitamin D, Cholecalciferol,) 25 MCG (1000 UT) tablet, Take 1 tablet by mouth Every 3 (Three) Days., Disp: , Rfl:     Empagliflozin-metFORMIN HCl ER (Synjardy XR)  MG tablet sustained-release 24 hour, Daily., Disp: , Rfl:     EPINEPHrine (EPIPEN) 0.3 MG/0.3ML solution auto-injector injection, USE AS DIRECTED FOR ACUTE ALLERGIC REACTION, Disp: , Rfl:     Fluticasone-Salmeterol (Advair Diskus) 250-50 MCG/ACT DISKUS, As Needed., Disp: , Rfl:     levothyroxine (SYNTHROID, LEVOTHROID) 50 MCG tablet, Take 1 tablet by mouth Daily., Disp: , Rfl: 3    montelukast (SINGULAIR) 10 MG tablet, Take 1 tablet by  "mouth every night at bedtime., Disp: , Rfl:     Omega-3 Fatty Acids (FISH OIL) 1000 MG capsule capsule, Take 2 capsules by mouth Daily., Disp: , Rfl:     OneTouch Verio test strip, 1 each by Other route., Disp: , Rfl:     pioglitazone (ACTOS) 15 MG tablet, Take 1 tablet by mouth Daily., Disp: , Rfl:     PROAIR  (90 Base) MCG/ACT inhaler, , Disp: , Rfl:     Semaglutide, 2 MG/DOSE, (OZEMPIC) 8 MG/3ML solution pen-injector, Inject 2 mg under the skin into the appropriate area as directed Every 7 (Seven) Days., Disp: , Rfl:     valACYclovir (VALTREX) 1000 MG tablet, TAKE 2 TABLETS BY MOUTH 2 TIMES DAILY FOR 1 DAY. (ENOUGH FOR 3 OUTBREAKS), Disp: , Rfl: 0    valsartan-hydrochlorothiazide (DIOVAN-HCT) 320-12.5 MG per tablet, Take 1 tablet by mouth 2 (Two) Times a Day., Disp: , Rfl:     ALLERGIES  Trazodone    VITALS  Vitals:    02/14/25 1006   BP: 120/70   BP Location: Left arm   Patient Position: Sitting   Cuff Size: Adult   Weight: 65.5 kg (144 lb 6.4 oz)   Height: 149.9 cm (59\")       PHYSICAL EXAM  Debilities/Disabilities Identified: None  Emotional Behavior: Appropriate  Wt Readings from Last 3 Encounters:   02/14/25 65.5 kg (144 lb 6.4 oz)   11/05/24 66.8 kg (147 lb 3.2 oz)   10/10/24 68.1 kg (150 lb 3.2 oz)     Ht Readings from Last 1 Encounters:   02/14/25 149.9 cm (59\")     Body mass index is 29.17 kg/m².  Physical Exam  Constitutional:       General: She is not in acute distress.     Appearance: Normal appearance. She is not ill-appearing.   HENT:      Head: Normocephalic and atraumatic.      Mouth/Throat:      Mouth: Mucous membranes are moist.      Pharynx: No posterior oropharyngeal erythema.   Eyes:      General: No scleral icterus.  Cardiovascular:      Rate and Rhythm: Normal rate and regular rhythm.      Heart sounds: Normal heart sounds.   Pulmonary:      Effort: Pulmonary effort is normal.      Breath sounds: Normal breath sounds.   Abdominal:      General: Abdomen is flat. Bowel sounds are " normal. There is no distension.      Palpations: Abdomen is soft. There is no mass.      Tenderness: There is no abdominal tenderness. There is no guarding or rebound. Negative signs include Ayala's sign.      Hernia: No hernia is present.   Musculoskeletal:      Cervical back: Neck supple.   Skin:     General: Skin is warm.      Capillary Refill: Capillary refill takes less than 2 seconds.   Neurological:      General: No focal deficit present.      Mental Status: She is alert and oriented to person, place, and time.   Psychiatric:         Mood and Affect: Mood normal.         Behavior: Behavior normal.         Thought Content: Thought content normal.         Judgment: Judgment normal.         CLINICAL DATA REVIEWED   reviewed previous lab results and integrated with today's visit, reviewed notes from other physicians and/or last GI encounter, reviewed previous endoscopy results and available photos, reviewed surgical pathology results from previous biopsies    ASSESSMENT  Diagnoses and all orders for this visit:    DAS (nonalcoholic steatohepatitis)  -     Comprehensive Metabolic Panel    Personal history of adenomatous and serrated colon polyps    Other orders  -     Empagliflozin-metFORMIN HCl ER (Synjardy XR)  MG tablet sustained-release 24 hour; Daily.          PLAN    MASH: Continue with weight loss, exercise, encouraged to continue progress, will hold off on rezdiffra right now, patient seems to be managing well    Return in about 4 months (around 6/14/2025).    I have discussed the above plan with the patient.  They verbalize understanding and are in agreement with the plan.  They have been advised to contact the office for any questions, concerns, or changes related to their health.

## 2025-06-13 ENCOUNTER — OFFICE VISIT (OUTPATIENT)
Dept: GASTROENTEROLOGY | Facility: CLINIC | Age: 70
End: 2025-06-13
Payer: MEDICARE

## 2025-06-13 VITALS
DIASTOLIC BLOOD PRESSURE: 88 MMHG | HEIGHT: 59 IN | BODY MASS INDEX: 29.15 KG/M2 | SYSTOLIC BLOOD PRESSURE: 124 MMHG | WEIGHT: 144.6 LBS

## 2025-06-13 DIAGNOSIS — K75.81 METABOLIC DYSFUNCTION-ASSOCIATED STEATOHEPATITIS (MASH): Primary | ICD-10-CM

## 2025-06-13 NOTE — PROGRESS NOTES
"    PATIENT INFORMATION  Renee Jama       - 1955    CHIEF COMPLAINT  Chief Complaint   Patient presents with    Non-alcholic steatohepatitis       HISTORY OF PRESENT ILLNESS  Here today for DAS follow-up     DAS: At LOV she was exercising more and had lost some weight. LFTs were normal, and remain normal on most recent check. Continue to provide encouragement for healthy lifestyle choices. Most recent trig 157 which is good improvement.    Needs to focus on eating better.      Just got back from [place] and really enjoyed and is getting back in the habit of exercising.     4/3/24 Fibroscan F0-1, S3  10/10/24 Fibrosure N2, S2-3, F1-F2  3/4/21 F0-F1, S0-S1, N0.    Doing well on ozempic and not having any GI side effects. Still doing well with Ozempic with out GI concerns.      2023 Last CT with Fatty liver     2014 Colon with 1/2 adenomas  2019 normal colon          REVIEWED PERTINENT RESULTS/ LABS  No results found for: \"CASEREPORT\", \"FINALDX\"  Lab Results   Component Value Date    HGB 16.0 2022    MCV 99.4 2022     2022    ALT 27 2025    AST 24 2025    HGBA1C 6.1 (H) 2024    TRIG 255 (H) 10/10/2024    FERRITIN 134.1 2014    IRON 89 2024    TIBC 526 2024      No results found.    REVIEW OF SYSTEMS  Review of Systems      ACTIVE PROBLEMS  Patient Active Problem List    Diagnosis     Hypothyroidism [E03.9]     DAS (nonalcoholic steatohepatitis) [K75.81]     Type 2 diabetes mellitus without complication, without long-term current use of insulin [E11.9]          PAST MEDICAL HISTORY  Past Medical History:   Diagnosis Date    Bursitis of hip     Colon polyp     CTS (carpal tunnel syndrome)     Diabetes mellitus     Fatty liver     Frozen shoulder     Hyperlipidemia     Hypertension     Liver disease     Osteopenia     Wrist sprain          SURGICAL HISTORY  Past Surgical History:   Procedure Laterality Date    COLONOSCOPY      FOOT " SURGERY      HAND SURGERY           FAMILY HISTORY  Family History   Problem Relation Age of Onset    Liver disease Father     Diabetes Mother         Diabetes    Diabetes Maternal Aunt         Diabetes    Colon cancer Neg Hx     Colon polyps Neg Hx          SOCIAL HISTORY  Social History     Occupational History    Not on file   Tobacco Use    Smoking status: Former     Current packs/day: 1.00     Average packs/day: 1 pack/day for 5.0 years (5.0 ttl pk-yrs)     Types: Cigarettes     Passive exposure: Past    Smokeless tobacco: Never    Tobacco comments:     From age 16-age 21   Vaping Use    Vaping status: Never Used   Substance and Sexual Activity    Alcohol use: Not Currently     Comment: 1-2 glasses  of wine per month    Drug use: Never    Sexual activity: Yes     Partners: Male     Birth control/protection: Post-menopausal         CURRENT MEDICATIONS    Current Outpatient Medications:     aspirin 81 MG EC tablet, Take 1 tablet by mouth Daily., Disp: , Rfl:     atorvastatin (LIPITOR) 10 MG tablet, Take 1 tablet by mouth Daily., Disp: , Rfl:     Calcium Carbonate-Vit D-Min (CALCIUM 1200 PO), Take  by mouth Daily., Disp: , Rfl:     cholecalciferol (Vitamin D, Cholecalciferol,) 25 MCG (1000 UT) tablet, Take 1 tablet by mouth Every 3 (Three) Days., Disp: , Rfl:     Empagliflozin-metFORMIN HCl ER (Synjardy XR)  MG tablet sustained-release 24 hour, Daily., Disp: , Rfl:     EPINEPHrine (EPIPEN) 0.3 MG/0.3ML solution auto-injector injection, USE AS DIRECTED FOR ACUTE ALLERGIC REACTION, Disp: , Rfl:     Fluticasone-Salmeterol (Advair Diskus) 250-50 MCG/ACT DISKUS, As Needed., Disp: , Rfl:     levothyroxine (SYNTHROID, LEVOTHROID) 50 MCG tablet, Take 1 tablet by mouth Daily., Disp: , Rfl: 3    montelukast (SINGULAIR) 10 MG tablet, Take 1 tablet by mouth every night at bedtime., Disp: , Rfl:     Omega-3 Fatty Acids (FISH OIL) 1000 MG capsule capsule, Take 2 capsules by mouth Daily., Disp: , Rfl:     OneTouch Verio  "test strip, 1 each by Other route., Disp: , Rfl:     pioglitazone (ACTOS) 15 MG tablet, Take 1 tablet by mouth Daily., Disp: , Rfl:     PROAIR  (90 Base) MCG/ACT inhaler, , Disp: , Rfl:     Semaglutide, 2 MG/DOSE, (OZEMPIC) 8 MG/3ML solution pen-injector, Inject 2 mg under the skin into the appropriate area as directed Every 7 (Seven) Days., Disp: , Rfl:     valACYclovir (VALTREX) 1000 MG tablet, TAKE 2 TABLETS BY MOUTH 2 TIMES DAILY FOR 1 DAY. (ENOUGH FOR 3 OUTBREAKS), Disp: , Rfl: 0    valsartan-hydrochlorothiazide (DIOVAN-HCT) 320-12.5 MG per tablet, Take 1 tablet by mouth Daily., Disp: , Rfl:     ALLERGIES  Trazodone    VITALS  Vitals:    06/13/25 1012   BP: 124/88   Weight: 65.6 kg (144 lb 9.6 oz)   Height: 149.9 cm (59\")       PHYSICAL EXAM  Debilities/Disabilities Identified: None  Emotional Behavior: Appropriate  Wt Readings from Last 3 Encounters:   06/13/25 65.6 kg (144 lb 9.6 oz)   02/14/25 65.5 kg (144 lb 6.4 oz)   11/05/24 66.8 kg (147 lb 3.2 oz)     Ht Readings from Last 1 Encounters:   06/13/25 149.9 cm (59\")     Body mass index is 29.21 kg/m².  Physical Exam  Constitutional:       General: She is not in acute distress.     Appearance: Normal appearance. She is not ill-appearing.   HENT:      Head: Normocephalic and atraumatic.      Mouth/Throat:      Mouth: Mucous membranes are moist.      Pharynx: No posterior oropharyngeal erythema.   Eyes:      General: No scleral icterus.  Cardiovascular:      Rate and Rhythm: Normal rate and regular rhythm.      Heart sounds: Normal heart sounds.   Pulmonary:      Effort: Pulmonary effort is normal.      Breath sounds: Normal breath sounds.   Abdominal:      General: Abdomen is flat. Bowel sounds are normal. There is no distension.      Palpations: Abdomen is soft. There is no mass.      Tenderness: There is no abdominal tenderness. There is no guarding or rebound. Negative signs include Ayala's sign.      Hernia: No hernia is present.   Musculoskeletal: "      Cervical back: Neck supple.   Skin:     General: Skin is warm.      Capillary Refill: Capillary refill takes less than 2 seconds.   Neurological:      General: No focal deficit present.      Mental Status: She is alert and oriented to person, place, and time.   Psychiatric:         Mood and Affect: Mood normal.         Behavior: Behavior normal.         Thought Content: Thought content normal.         Judgment: Judgment normal.         CLINICAL DATA REVIEWED   reviewed previous lab results and integrated with today's visit, reviewed notes from other physicians and/or last GI encounter, reviewed previous endoscopy results and available photos, reviewed surgical pathology results from previous biopsies    ASSESSMENT  Diagnoses and all orders for this visit:    Metabolic dysfunction-associated steatohepatitis (MASH)          PLAN    MASH continue positive lifestyle changes  Will f/u in 6 mos with labs    Return in about 6 months (around 12/13/2025).    I have discussed the above plan with the patient.  They verbalize understanding and are in agreement with the plan.  They have been advised to contact the office for any questions, concerns, or changes related to their health.